# Patient Record
Sex: MALE | Race: WHITE | NOT HISPANIC OR LATINO | Employment: FULL TIME | URBAN - METROPOLITAN AREA
[De-identification: names, ages, dates, MRNs, and addresses within clinical notes are randomized per-mention and may not be internally consistent; named-entity substitution may affect disease eponyms.]

---

## 2023-04-23 DIAGNOSIS — K21.9 GASTRO-ESOPHAGEAL REFLUX DISEASE WITHOUT ESOPHAGITIS: ICD-10-CM

## 2023-04-24 ENCOUNTER — TELEPHONE (OUTPATIENT)
Dept: PRIMARY CARE | Facility: CLINIC | Age: 63
End: 2023-04-24
Payer: COMMERCIAL

## 2023-04-24 DIAGNOSIS — M62.830 BACK SPASM: ICD-10-CM

## 2023-04-24 DIAGNOSIS — I10 PRIMARY HYPERTENSION: Primary | ICD-10-CM

## 2023-04-24 DIAGNOSIS — K21.9 GASTROESOPHAGEAL REFLUX DISEASE WITHOUT ESOPHAGITIS: ICD-10-CM

## 2023-04-24 RX ORDER — LISINOPRIL AND HYDROCHLOROTHIAZIDE 12.5; 2 MG/1; MG/1
1 TABLET ORAL DAILY
Qty: 30 TABLET | Refills: 1 | Status: SHIPPED | OUTPATIENT
Start: 2023-04-24 | End: 2023-05-22 | Stop reason: SDUPTHER

## 2023-04-24 RX ORDER — PANTOPRAZOLE SODIUM 20 MG/1
20 TABLET, DELAYED RELEASE ORAL DAILY
COMMUNITY
End: 2023-04-24 | Stop reason: SDUPTHER

## 2023-04-24 RX ORDER — LISINOPRIL AND HYDROCHLOROTHIAZIDE 12.5; 2 MG/1; MG/1
1 TABLET ORAL DAILY
COMMUNITY
End: 2023-04-24 | Stop reason: SDUPTHER

## 2023-04-24 RX ORDER — CYCLOBENZAPRINE HCL 5 MG
5 TABLET ORAL 3 TIMES DAILY PRN
Qty: 30 TABLET | Refills: 1 | Status: SHIPPED | OUTPATIENT
Start: 2023-04-24 | End: 2023-05-22 | Stop reason: SDUPTHER

## 2023-04-24 RX ORDER — CYCLOBENZAPRINE HCL 5 MG
5 TABLET ORAL 3 TIMES DAILY PRN
COMMUNITY
Start: 2022-11-01 | End: 2023-04-24 | Stop reason: SDUPTHER

## 2023-04-24 RX ORDER — PANTOPRAZOLE SODIUM 20 MG/1
20 TABLET, DELAYED RELEASE ORAL DAILY
Qty: 30 TABLET | Refills: 1 | Status: SHIPPED | OUTPATIENT
Start: 2023-04-24 | End: 2023-12-29 | Stop reason: SDUPTHER

## 2023-04-24 RX ORDER — PANTOPRAZOLE SODIUM 20 MG/1
TABLET, DELAYED RELEASE ORAL
Qty: 90 TABLET | Refills: 1 | Status: SHIPPED | OUTPATIENT
Start: 2023-04-24 | End: 2023-04-24 | Stop reason: SDUPTHER

## 2023-04-24 NOTE — TELEPHONE ENCOUNTER
Rx Refill Request Telephone Encounter    Name:  Mike Parkinson  :  [id number]  Medication Name:  flexeril, protonix, lisinopril-hydrochlorothiazide    Dose : per chart    Specific Pharmacy location:  per chart  Date of last appointment:  November  Date of next appointment:  needs appt  Best number to reach patient:  per chart

## 2023-05-22 ENCOUNTER — OFFICE VISIT (OUTPATIENT)
Dept: PRIMARY CARE | Facility: CLINIC | Age: 63
End: 2023-05-22
Payer: COMMERCIAL

## 2023-05-22 VITALS
BODY MASS INDEX: 26.83 KG/M2 | TEMPERATURE: 98 F | OXYGEN SATURATION: 98 % | DIASTOLIC BLOOD PRESSURE: 79 MMHG | WEIGHT: 177 LBS | HEART RATE: 72 BPM | SYSTOLIC BLOOD PRESSURE: 117 MMHG | HEIGHT: 68 IN | RESPIRATION RATE: 16 BRPM

## 2023-05-22 DIAGNOSIS — M67.912 TENDINOPATHY OF LEFT SHOULDER: ICD-10-CM

## 2023-05-22 DIAGNOSIS — I10 HTN (HYPERTENSION), BENIGN: ICD-10-CM

## 2023-05-22 DIAGNOSIS — M54.41 CHRONIC BILATERAL LOW BACK PAIN WITH BILATERAL SCIATICA: Primary | ICD-10-CM

## 2023-05-22 DIAGNOSIS — G89.29 CHRONIC BILATERAL LOW BACK PAIN WITH BILATERAL SCIATICA: Primary | ICD-10-CM

## 2023-05-22 DIAGNOSIS — M54.42 CHRONIC BILATERAL LOW BACK PAIN WITH BILATERAL SCIATICA: Primary | ICD-10-CM

## 2023-05-22 PROBLEM — L29.89 PRURITIC ERYTHEMATOUS RASH: Status: RESOLVED | Noted: 2023-05-22 | Resolved: 2023-05-22

## 2023-05-22 PROBLEM — F41.9 ANXIETY: Status: ACTIVE | Noted: 2023-05-22

## 2023-05-22 PROBLEM — D72.823 LEUKEMOID REACTION: Status: RESOLVED | Noted: 2023-05-22 | Resolved: 2023-05-22

## 2023-05-22 PROBLEM — B34.2 CORONAVIRUS INFECTION, UNSPECIFIED: Status: RESOLVED | Noted: 2023-05-22 | Resolved: 2023-05-22

## 2023-05-22 PROBLEM — R07.9 CHEST PAIN: Status: RESOLVED | Noted: 2023-05-22 | Resolved: 2023-05-22

## 2023-05-22 PROBLEM — H61.23 BILATERAL IMPACTED CERUMEN: Status: RESOLVED | Noted: 2023-05-22 | Resolved: 2023-05-22

## 2023-05-22 PROBLEM — K58.2 IRRITABLE BOWEL SYNDROME WITH BOTH CONSTIPATION AND DIARRHEA: Status: ACTIVE | Noted: 2023-05-22

## 2023-05-22 PROBLEM — M54.2 NECK PAIN: Status: RESOLVED | Noted: 2023-05-22 | Resolved: 2023-05-22

## 2023-05-22 PROBLEM — L08.9 SKIN INFECTION, BACTERIAL: Status: RESOLVED | Noted: 2023-05-22 | Resolved: 2023-05-22

## 2023-05-22 PROBLEM — L82.1 SEBORRHEIC KERATOSIS: Status: ACTIVE | Noted: 2023-05-22

## 2023-05-22 PROBLEM — H10.30 ACUTE CONJUNCTIVITIS: Status: RESOLVED | Noted: 2023-05-22 | Resolved: 2023-05-22

## 2023-05-22 PROBLEM — H57.10 EYE PAIN: Status: RESOLVED | Noted: 2023-05-22 | Resolved: 2023-05-22

## 2023-05-22 PROBLEM — K21.9 GASTROESOPHAGEAL REFLUX DISEASE WITHOUT ESOPHAGITIS: Status: ACTIVE | Noted: 2023-05-22

## 2023-05-22 PROBLEM — J06.9 VIRAL URI WITH COUGH: Status: RESOLVED | Noted: 2023-05-22 | Resolved: 2023-05-22

## 2023-05-22 PROBLEM — L30.9 DERMATITIS: Status: RESOLVED | Noted: 2023-05-22 | Resolved: 2023-05-22

## 2023-05-22 PROBLEM — B96.89 SKIN INFECTION, BACTERIAL: Status: RESOLVED | Noted: 2023-05-22 | Resolved: 2023-05-22

## 2023-05-22 PROBLEM — L29.8 PRURITIC ERYTHEMATOUS RASH: Status: RESOLVED | Noted: 2023-05-22 | Resolved: 2023-05-22

## 2023-05-22 PROCEDURE — 99214 OFFICE O/P EST MOD 30 MIN: CPT

## 2023-05-22 PROCEDURE — 3078F DIAST BP <80 MM HG: CPT

## 2023-05-22 PROCEDURE — 3074F SYST BP LT 130 MM HG: CPT

## 2023-05-22 PROCEDURE — 1036F TOBACCO NON-USER: CPT

## 2023-05-22 RX ORDER — LORATADINE 10 MG
10 TABLET,DISINTEGRATING ORAL DAILY
COMMUNITY
End: 2023-12-29 | Stop reason: WASHOUT

## 2023-05-22 RX ORDER — CYCLOBENZAPRINE HCL 5 MG
5 TABLET ORAL 3 TIMES DAILY PRN
Qty: 90 TABLET | Refills: 0 | Status: SHIPPED | OUTPATIENT
Start: 2023-05-22 | End: 2023-08-20

## 2023-05-22 RX ORDER — LISINOPRIL AND HYDROCHLOROTHIAZIDE 12.5; 2 MG/1; MG/1
1 TABLET ORAL NIGHTLY
Qty: 90 TABLET | Refills: 1 | Status: SHIPPED | OUTPATIENT
Start: 2023-05-22 | End: 2023-08-14 | Stop reason: SINTOL

## 2023-05-22 ASSESSMENT — ENCOUNTER SYMPTOMS
LIGHT-HEADEDNESS: 0
DIARRHEA: 0
CONSTIPATION: 0
HEADACHES: 0
CHEST TIGHTNESS: 0
ARTHRALGIAS: 1
CHILLS: 0
BACK PAIN: 1
SHORTNESS OF BREATH: 1
DIZZINESS: 0
ABDOMINAL PAIN: 0
PALPITATIONS: 0
FEVER: 0
HYPERTENSION: 1

## 2023-05-22 NOTE — PROGRESS NOTES
"Subjective   Mike Parkinson is a 62 y.o. male who presents for Follow-up (Follow up for blood pressure).    Hypertension  This is a chronic problem. Associated symptoms include anxiety and shortness of breath (with exercise). Pertinent negatives include no chest pain, headaches or palpitations.   BP at home mostly 120-130's/ 70-80s.  Occ 140 and 150s.  Taking medication at night, since it makes him drowsy. No side effects noted.    Flexeril is helping back pain. Taking as needed. Took 3 this past week. Persistent standing makes back pain worse, has gone through PT with improvement. Doing PT exercises few times a week.  Endorsing difficulty in exercising, especially walking, due to back pain.  Trying to walk at lunch hour, however limited when experiencing back pain.  Reports mild shortness of breath with exertion such as mowing the lawn. Doesn't need to stop and catch breath with activity.    Also reporting a weeklong history of left shoulder pain.  States he might of slept on his shoulder wrong.  No identifiable injury.  Has pain with putting on jacket or shirt.  Has not noticed any weakness.    Recently in ED for dehydration at [place].    Review of Systems   Constitutional:  Negative for chills and fever.   Eyes:  Negative for visual disturbance.   Respiratory:  Positive for shortness of breath (with exercise). Negative for chest tightness.    Cardiovascular:  Negative for chest pain, palpitations and leg swelling.   Gastrointestinal:  Negative for abdominal pain, constipation and diarrhea.   Musculoskeletal:  Positive for arthralgias (left shoulder) and back pain.   Neurological:  Negative for dizziness, light-headedness and headaches.     Objective     /79 (BP Location: Right arm, Patient Position: Sitting)   Pulse 72   Temp 36.7 °C (98 °F)   Resp 16   Ht 1.727 m (5' 8\")   Wt 80.3 kg (177 lb)   SpO2 98%   BMI 26.91 kg/m²     Physical Exam  Vitals reviewed.   Constitutional:       General: He is not in " acute distress.     Appearance: Normal appearance.   HENT:      Mouth/Throat:      Mouth: Mucous membranes are moist.      Pharynx: Oropharynx is clear.   Eyes:      Extraocular Movements: Extraocular movements intact.      Pupils: Pupils are equal, round, and reactive to light.   Cardiovascular:      Rate and Rhythm: Normal rate and regular rhythm.      Pulses: Normal pulses.      Heart sounds: Normal heart sounds. No murmur heard.  Pulmonary:      Effort: Pulmonary effort is normal.      Breath sounds: Normal breath sounds. No wheezing, rhonchi or rales.   Musculoskeletal:      Comments: Full range of motion and strength intact.  Pain with external rotation of left shoulder, point tenderness noted to posterior shoulder near teres minor.  No tenderness to SC joint, clavicle, AC joint.   Neurological:      Mental Status: He is alert.   Psychiatric:         Mood and Affect: Mood normal.       Assessment/Plan   Problem List Items Addressed This Visit       Chronic bilateral low back pain with bilateral sciatica - Primary     Still having pain with prolonged standing and walking.  Emphasized performing back exercises 3-5 times a week to help with this.  Refilled Flexeril for what appears to be helping.  Follow-up sooner if would like to try physical therapy again.         Relevant Medications    cyclobenzaprine (Flexeril) 5 mg tablet    HTN (hypertension), benign     Controlled with lisinopril-hydrochlorothiazide combination.  Continue to check blood pressure 2-3 times a week.  Lab work was done 6 months ago.  Will check CMP for electrolyte monitoring.  Follow-up in 6 months.         Relevant Medications    lisinopriL-hydrochlorothiazide 20-12.5 mg tablet    Other Relevant Orders    Comprehensive metabolic panel     Other Visit Diagnoses       Tendinopathy of left shoulder            Left shoulder pain likely consistent with tendinopathy given age could have component of arthritis.  Likely glenohumeral given most  amount of pain with external rotation.  Provided shoulder exercises to improve strength and range of motion.  Follow-up if persistent, consider PT.     Discussed with Attending,    Roselia Watt, DO PGY-2

## 2023-05-22 NOTE — ASSESSMENT & PLAN NOTE
Controlled with lisinopril-hydrochlorothiazide combination.  Continue to check blood pressure 2-3 times a week.  Lab work was done 6 months ago.  Will check CMP for electrolyte monitoring.  Follow-up in 6 months.

## 2023-05-22 NOTE — PROGRESS NOTES
I reviewed with the resident the medical history and the resident’s findings on physical examination.  I discussed with the resident the patient’s diagnosis and concur with the treatment plan as documented in the resident note.   Patient present for multiple issues.  He is more concerned about his blood pressure.  We will continue to monitor this.  Patient also has had back pain is chronic he has had physical therapy in the past.  Still bothering him.  Continue exercises.  Patient if he still having symptoms in 2 weeks can get an MRI.  Patient also is having some shortness of breath with activity.  He attributes this to deconditioning.  Its been going on for some time.  Per patient he did have a stress test 2 years ago.  We are not able to find that in the chart.  We will try to confirm it if so we will continue to monitor.  If not he will need to see cardiology and get a stress test.  Patient aware of the risk.  If he starts have any chest pain shortness of breath any nausea vomiting diarrhea fever headache any concerning symptoms go to the ER.  Follow-up in 2 weeks  Agree with assessment and plan    Kory Kumar, DO

## 2023-05-22 NOTE — PATIENT INSTRUCTIONS
It was nice seeing you in the office today.  Sign up for [employer] to get results instantly.  Labs will be fasting.  Try exercises for left shoulder.  Follow up 6 months for physical.  Call the office: [phone] for questions or concerns.

## 2023-05-22 NOTE — ASSESSMENT & PLAN NOTE
Still having pain with prolonged standing and walking.  Emphasized performing back exercises 3-5 times a week to help with this.  Refilled Flexeril for what appears to be helping.  Follow-up sooner if would like to try physical therapy again.

## 2023-06-01 ENCOUNTER — LAB (OUTPATIENT)
Dept: LAB | Facility: LAB | Age: 63
End: 2023-06-01
Payer: COMMERCIAL

## 2023-06-01 ENCOUNTER — TELEPHONE (OUTPATIENT)
Dept: PRIMARY CARE | Facility: CLINIC | Age: 63
End: 2023-06-01

## 2023-06-01 DIAGNOSIS — I10 HTN (HYPERTENSION), BENIGN: ICD-10-CM

## 2023-06-01 DIAGNOSIS — E87.1 HYPONATREMIA: Primary | ICD-10-CM

## 2023-06-01 LAB
ALANINE AMINOTRANSFERASE (SGPT) (U/L) IN SER/PLAS: 21 U/L (ref 10–52)
ALBUMIN (G/DL) IN SER/PLAS: 4.6 G/DL (ref 3.4–5)
ALKALINE PHOSPHATASE (U/L) IN SER/PLAS: 71 U/L (ref 33–136)
ANION GAP IN SER/PLAS: 12 MMOL/L (ref 10–20)
ASPARTATE AMINOTRANSFERASE (SGOT) (U/L) IN SER/PLAS: 20 U/L (ref 9–39)
BILIRUBIN TOTAL (MG/DL) IN SER/PLAS: 0.9 MG/DL (ref 0–1.2)
CALCIUM (MG/DL) IN SER/PLAS: 9.5 MG/DL (ref 8.6–10.3)
CARBON DIOXIDE, TOTAL (MMOL/L) IN SER/PLAS: 24 MMOL/L (ref 21–32)
CHLORIDE (MMOL/L) IN SER/PLAS: 99 MMOL/L (ref 98–107)
CREATININE (MG/DL) IN SER/PLAS: 0.82 MG/DL (ref 0.5–1.3)
GFR MALE: >90 ML/MIN/1.73M2
GLUCOSE (MG/DL) IN SER/PLAS: 105 MG/DL (ref 74–99)
POTASSIUM (MMOL/L) IN SER/PLAS: 3.9 MMOL/L (ref 3.5–5.3)
PROTEIN TOTAL: 7.2 G/DL (ref 6.4–8.2)
SODIUM (MMOL/L) IN SER/PLAS: 131 MMOL/L (ref 136–145)
UREA NITROGEN (MG/DL) IN SER/PLAS: 8 MG/DL (ref 6–23)

## 2023-06-01 PROCEDURE — 36415 COLL VENOUS BLD VENIPUNCTURE: CPT

## 2023-06-01 PROCEDURE — 80053 COMPREHEN METABOLIC PANEL: CPT

## 2023-06-01 NOTE — RESULT ENCOUNTER NOTE
Spoke with patient over the phone regarding lab work.  States he was fasting for at least 12 hours before obtaining labs he also drank about 16 ounces of water before obtaining labs.  He has elevated glucose and low sodium. Talked about diet eating more whole fruits and vegetables, lean meats, fiber. Limit fluids prior to lab work. Will recheck BMP in 4 weeks.

## 2023-06-01 NOTE — RESULT ENCOUNTER NOTE
Patient's sodium is still low.  He usually sees Dr. Zamorano.  Can we make sure he schedules a follow-up with Dr. Zamorano to determine why his sodium is low.  Please assure its in the next week  Let the patient know also, thank you

## 2023-06-05 LAB
B. BURGDORFERI VLSE1/PEPC10 ABS, ELISA: 0.15 IV
ROCKY MOUNTAIN SPOTTED FEVER IGG ANTIBODY: NORMAL
ROCKY MOUNTAIN SPOTTED FEVER IGM ANTIBODY: NORMAL

## 2023-06-06 LAB — LYME DISEASE (BORRELIA BURGDORFERI), PCR: NOT DETECTED

## 2023-06-29 ENCOUNTER — LAB (OUTPATIENT)
Dept: LAB | Facility: LAB | Age: 63
End: 2023-06-29
Payer: COMMERCIAL

## 2023-06-29 DIAGNOSIS — E87.1 HYPONATREMIA: ICD-10-CM

## 2023-06-29 LAB
ANION GAP IN SER/PLAS: 11 MMOL/L (ref 10–20)
CALCIUM (MG/DL) IN SER/PLAS: 9.7 MG/DL (ref 8.6–10.3)
CARBON DIOXIDE, TOTAL (MMOL/L) IN SER/PLAS: 28 MMOL/L (ref 21–32)
CHLORIDE (MMOL/L) IN SER/PLAS: 102 MMOL/L (ref 98–107)
CREATININE (MG/DL) IN SER/PLAS: 0.88 MG/DL (ref 0.5–1.3)
GFR MALE: >90 ML/MIN/1.73M2
GLUCOSE (MG/DL) IN SER/PLAS: 106 MG/DL (ref 74–99)
POTASSIUM (MMOL/L) IN SER/PLAS: 4.6 MMOL/L (ref 3.5–5.3)
SODIUM (MMOL/L) IN SER/PLAS: 136 MMOL/L (ref 136–145)
UREA NITROGEN (MG/DL) IN SER/PLAS: 11 MG/DL (ref 6–23)

## 2023-06-29 PROCEDURE — 36415 COLL VENOUS BLD VENIPUNCTURE: CPT

## 2023-06-29 PROCEDURE — 80048 BASIC METABOLIC PNL TOTAL CA: CPT

## 2023-07-31 ENCOUNTER — TELEPHONE (OUTPATIENT)
Dept: PRIMARY CARE | Facility: CLINIC | Age: 63
End: 2023-07-31
Payer: COMMERCIAL

## 2023-07-31 DIAGNOSIS — I10 HTN (HYPERTENSION), BENIGN: Primary | ICD-10-CM

## 2023-07-31 RX ORDER — ACETAZOLAMIDE 125 MG/1
125 TABLET ORAL DAILY
Qty: 7 TABLET | Refills: 0 | Status: SHIPPED | OUTPATIENT
Start: 2023-07-31 | End: 2023-08-14

## 2023-08-08 ENCOUNTER — DOCUMENTATION (OUTPATIENT)
Dept: PRIMARY CARE | Facility: CLINIC | Age: 63
End: 2023-08-08
Payer: COMMERCIAL

## 2023-08-08 ENCOUNTER — PATIENT OUTREACH (OUTPATIENT)
Dept: PRIMARY CARE | Facility: CLINIC | Age: 63
End: 2023-08-08
Payer: COMMERCIAL

## 2023-08-08 NOTE — PROGRESS NOTES
Discharge Facility: [facility]  Discharge Diagnosis:HYPONATREMIA  Admission Date:8/6/2023  Discharge Date: 8/7/2023    PCP Appointment Date:8/14/2023  Specialist Appointment Date: NONE  Hospital Encounter and Summary: Linked   See discharge assessment below for further details       Engagement  Call Start Time: 1132 (8/8/2023 11:32 AM)    Medications  Medications reviewed with patient/caregiver?: Yes (8/8/2023 11:32 AM)  Is the patient having any side effects they believe may be caused by any medication additions or changes?: No (8/8/2023 11:32 AM)  Does the patient have all medications ordered at discharge?: Yes (no new meds. stop lisinopril-hctz) (8/8/2023 11:32 AM)  Is the patient taking all medications as directed (includes completed medication regime)?: Yes (8/8/2023 11:32 AM)  Medication Comments: see med list (8/8/2023 11:32 AM)    Appointments  Does the patient have a primary care provider?: Yes (8/8/2023 11:32 AM)  Care Management Interventions: Verified appointment date/time/provider (8/8/2023 11:32 AM)  Has the patient kept scheduled appointments due by today?: Yes (8/8/2023 11:32 AM)  Care Management Interventions: Advised patient to keep appointment (8/8/2023 11:32 AM)    Self Management  What is the home health agency?: none (8/8/2023 11:32 AM)  Has home health visited the patient within 72 hours of discharge?: Not applicable (8/8/2023 11:32 AM)    Patient Teaching  Does the patient have access to their discharge instructions?: Yes (8/8/2023 11:32 AM)  Care Management Interventions: Reviewed instructions with patient (8/8/2023 11:32 AM)  What is the patient's perception of their health status since discharge?: Improving (8/8/2023 11:32 AM)  Is the patient/caregiver able to teach back the hierarchy of who to call/visit for symptoms/problems? PCP, Specialist, Home Health nurse, Urgent Care, ED, 911: Yes (8/8/2023 11:32 AM)    Wrap Up  Wrap Up Additional Comments: spoke with patient. he stated that he is doing  better. still a little tired. eating and sleeping. aware of medication change. has follow up with pcp scheduled. pt voiced no concerns or questions at this time. (8/8/2023 11:32 AM)  Call End Time: 1136 (8/8/2023 11:32 AM)

## 2023-08-14 ENCOUNTER — OFFICE VISIT (OUTPATIENT)
Dept: PRIMARY CARE | Facility: CLINIC | Age: 63
End: 2023-08-14
Payer: COMMERCIAL

## 2023-08-14 VITALS
SYSTOLIC BLOOD PRESSURE: 152 MMHG | TEMPERATURE: 98.1 F | BODY MASS INDEX: 27.67 KG/M2 | HEART RATE: 70 BPM | RESPIRATION RATE: 18 BRPM | OXYGEN SATURATION: 98 % | DIASTOLIC BLOOD PRESSURE: 89 MMHG | WEIGHT: 182 LBS

## 2023-08-14 DIAGNOSIS — E87.1 ACUTE HYPONATREMIA: ICD-10-CM

## 2023-08-14 DIAGNOSIS — I10 HTN (HYPERTENSION), BENIGN: Primary | ICD-10-CM

## 2023-08-14 PROCEDURE — 3079F DIAST BP 80-89 MM HG: CPT

## 2023-08-14 PROCEDURE — 99213 OFFICE O/P EST LOW 20 MIN: CPT

## 2023-08-14 PROCEDURE — 3077F SYST BP >= 140 MM HG: CPT

## 2023-08-14 PROCEDURE — 1036F TOBACCO NON-USER: CPT

## 2023-08-14 RX ORDER — AMLODIPINE AND BENAZEPRIL HYDROCHLORIDE 5; 20 MG/1; MG/1
1 CAPSULE ORAL DAILY
Qty: 90 CAPSULE | Refills: 0 | Status: SHIPPED | OUTPATIENT
Start: 2023-08-14 | End: 2023-08-14 | Stop reason: ENTERED-IN-ERROR

## 2023-08-14 RX ORDER — BENAZEPRIL HYDROCHLORIDE 20 MG/1
20 TABLET ORAL DAILY
Qty: 90 TABLET | Refills: 0 | Status: SHIPPED | OUTPATIENT
Start: 2023-08-14 | End: 2023-12-20 | Stop reason: SDUPTHER

## 2023-08-14 ASSESSMENT — ENCOUNTER SYMPTOMS
PALPITATIONS: 0
CHILLS: 0
LIGHT-HEADEDNESS: 1
APPETITE CHANGE: 0
FEVER: 0
ACTIVITY CHANGE: 0
SHORTNESS OF BREATH: 0

## 2023-08-14 NOTE — PROGRESS NOTES
"Patient: Mike Parkinson  : 1960  PCP: Wing Zamorano DO  MRN: [id number]  Program: No linked episodes     Mike Parkinson is a 62 y.o. male presenting today for follow-up after being discharged from the hospital 7 days ago. The main problem requiring admission was hypovolemic hyponatremia. The discharge summary and/or Transitional Care Management documentation was reviewed. Medication reconciliation was performed as indicated via the \"Robert as Reviewed\" timestamp.     Mike Parkinson was contacted by Transitional Care Management services two days after his discharge. This encounter and supporting documentation was reviewed.    The complexity of medical decision making for this patient's transitional care is moderate.  Reviewed discharge summary on 2023.    He was having altitude sickness while in [place], went to  and given Zofran with some improvement in nausea. Came back to [place], still with nausea, decreased appetite, still lightheaded and about to fall so he went to ED, and found to have low sodium due to fluid losses. Urine sodium c/w hypovolemic hyponatremia.    He is drinking more than 2 liters in 24 hour period. Some Gatorade.   No more nausea, now with an appetite.    BP has been 140/80 at home.  He does like to fish and would like to keep fishing in the future. He states he has had altitude sickness in the past.    Physical Exam  Vitals reviewed.   Constitutional:       General: He is not in acute distress.     Appearance: Normal appearance. He is not ill-appearing.   HENT:      Head: Normocephalic.      Mouth/Throat:      Mouth: Mucous membranes are moist.      Pharynx: Oropharynx is clear.   Cardiovascular:      Rate and Rhythm: Normal rate and regular rhythm.      Pulses: Normal pulses.      Heart sounds: Normal heart sounds. No murmur heard.  Pulmonary:      Effort: Pulmonary effort is normal.      Breath sounds: Normal breath sounds. No wheezing, rhonchi or rales.   Skin:     General: Skin " is warm and dry.      Capillary Refill: Capillary refill takes less than 2 seconds.   Neurological:      Mental Status: He is alert.   Psychiatric:         Mood and Affect: Mood normal.       Assessment/Plan   Problem List Items Addressed This Visit       HTN (hypertension), benign - Primary     Restart blood pressure medication, benazepril 20 mg.  Patient to check blood pressures 2-3 times per week and call or message in 1 month his blood pressure readings.  If tolerating and no longer lightheaded with blood pressure less than 130/80, will continue.  He is due for physical in November of this year.             Relevant Medications    benazepril (Lotensin) 20 mg tablet     Other Visit Diagnoses       Acute hyponatremia        Sodium at discharge was 131.  Repeat BMP.    Relevant Orders    Basic metabolic panel          Review of Systems   Constitutional:  Negative for activity change, appetite change, chills and fever.   Respiratory:  Negative for shortness of breath.    Cardiovascular:  Negative for chest pain, palpitations and leg swelling.   Neurological:  Positive for light-headedness (with activity).     No family history on file.    Engagement  Call Start Time: 1132 (8/8/2023 11:32 AM)    Medications  Medications reviewed with patient/caregiver?: Yes (8/8/2023 11:32 AM)  Is the patient having any side effects they believe may be caused by any medication additions or changes?: No (8/8/2023 11:32 AM)  Does the patient have all medications ordered at discharge?: Yes (no new meds. stop lisinopril-hctz) (8/8/2023 11:32 AM)  Is the patient taking all medications as directed (includes completed medication regime)?: Yes (8/8/2023 11:32 AM)  Medication Comments: see med list (8/8/2023 11:32 AM)    Appointments  Does the patient have a primary care provider?: Yes (8/8/2023 11:32 AM)  Care Management Interventions: Verified appointment date/time/provider (8/8/2023 11:32 AM)  Has the patient kept scheduled appointments  due by today?: Yes (8/8/2023 11:32 AM)  Care Management Interventions: Advised patient to keep appointment (8/8/2023 11:32 AM)    Self Management  What is the home health agency?: none (8/8/2023 11:32 AM)  Has home health visited the patient within 72 hours of discharge?: Not applicable (8/8/2023 11:32 AM)    Patient Teaching  Does the patient have access to their discharge instructions?: Yes (8/8/2023 11:32 AM)  Care Management Interventions: Reviewed instructions with patient (8/8/2023 11:32 AM)  What is the patient's perception of their health status since discharge?: Improving (8/8/2023 11:32 AM)  Is the patient/caregiver able to teach back the hierarchy of who to call/visit for symptoms/problems? PCP, Specialist, Home Health nurse, Urgent Care, ED, 911: Yes (8/8/2023 11:32 AM)    Wrap Up  Wrap Up Additional Comments: spoke with patient. he stated that he is doing better. still a little tired. eating and sleeping. aware of medication change. has follow up with pcp scheduled. pt voiced no concerns or questions at this time. (8/8/2023 11:32 AM)  Call End Time: 1136 (8/8/2023 11:32 AM)      Discussed with Attending,    Roselia Watt, DO PGY-3

## 2023-08-14 NOTE — ASSESSMENT & PLAN NOTE
Restart blood pressure medication, benazepril 20 mg.  Patient to check blood pressures 2-3 times per week and call or message in 1 month his blood pressure readings.  If tolerating and no longer lightheaded with blood pressure less than 130/80, will continue.  He is due for physical in November of this year.

## 2023-08-14 NOTE — PROGRESS NOTES
I reviewed with the resident the medical history and the resident’s findings on physical examination.  I discussed with the resident the patient’s diagnosis and concur with the treatment plan as documented in the resident note.   Patient is doing better.  He still felt a little off cutting the grass.  Is been trying to hydrate, watch and wait drinks.  His blood pressure is high today.  States stopped his blood pressure medications.  We will restart ACE inhibitor.  This could be contributing somewhat to his hyponatremia so we will recheck his blood work in a few days.  Patient is can monitor his blood pressure he comes in the office in a few days for a blood pressure check.  If it still elevated we can add amlodipine.  We can hold off on a diuretic at this time.  Patient continues to make sure he is hydrating  Patient aware if any chest pain shortness of breath any nausea vomiting or fever headache any concerning symptoms go to the ER, if any dizziness any confusion go to the ER  Follow-up in 2 weeks  Side effects of medication explained  Agree with assessment and plan  Kory Kumar, DO

## 2023-08-14 NOTE — PATIENT INSTRUCTIONS
It was nice seeing you in the office today.  Sign up for [employer] to get results instantly.  Obtain labs.  Call office/ send my chart message in 1 month with BP readings.  Call the office: [phone] for questions or concerns.  Please allow 48-72 hours for medication refills.

## 2023-08-18 ENCOUNTER — PATIENT OUTREACH (OUTPATIENT)
Dept: PRIMARY CARE | Facility: CLINIC | Age: 63
End: 2023-08-18
Payer: COMMERCIAL

## 2023-08-18 NOTE — PROGRESS NOTES
Call regarding appt. with PCP on 8/14/2023 after hospitalization.  At time of outreach call the patient feels as if their condition has improved since last visit.  Reviewed the PCP appointment with the pt and addressed any questions or concerns.

## 2023-08-21 ENCOUNTER — LAB (OUTPATIENT)
Dept: LAB | Facility: LAB | Age: 63
End: 2023-08-21
Payer: COMMERCIAL

## 2023-08-21 DIAGNOSIS — E87.1 ACUTE HYPONATREMIA: ICD-10-CM

## 2023-08-21 LAB
ANION GAP IN SER/PLAS: 12 MMOL/L (ref 10–20)
CALCIUM (MG/DL) IN SER/PLAS: 9.4 MG/DL (ref 8.6–10.3)
CARBON DIOXIDE, TOTAL (MMOL/L) IN SER/PLAS: 24 MMOL/L (ref 21–32)
CHLORIDE (MMOL/L) IN SER/PLAS: 107 MMOL/L (ref 98–107)
CREATININE (MG/DL) IN SER/PLAS: 0.79 MG/DL (ref 0.5–1.3)
GFR MALE: >90 ML/MIN/1.73M2
GLUCOSE (MG/DL) IN SER/PLAS: 89 MG/DL (ref 74–99)
POTASSIUM (MMOL/L) IN SER/PLAS: 4.3 MMOL/L (ref 3.5–5.3)
SODIUM (MMOL/L) IN SER/PLAS: 139 MMOL/L (ref 136–145)
UREA NITROGEN (MG/DL) IN SER/PLAS: 11 MG/DL (ref 6–23)

## 2023-08-21 PROCEDURE — 36415 COLL VENOUS BLD VENIPUNCTURE: CPT

## 2023-08-21 PROCEDURE — 80048 BASIC METABOLIC PNL TOTAL CA: CPT

## 2023-09-18 ENCOUNTER — PATIENT OUTREACH (OUTPATIENT)
Dept: PRIMARY CARE | Facility: CLINIC | Age: 63
End: 2023-09-18
Payer: COMMERCIAL

## 2023-09-18 NOTE — PROGRESS NOTES
Unable to reach patient for one month post discharge follow up call. LVM with call back number for patient to call if needed

## 2023-11-07 ENCOUNTER — PATIENT OUTREACH (OUTPATIENT)
Dept: PRIMARY CARE | Facility: CLINIC | Age: 63
End: 2023-11-07
Payer: COMMERCIAL

## 2023-12-19 ENCOUNTER — TELEPHONE (OUTPATIENT)
Dept: PRIMARY CARE | Facility: CLINIC | Age: 63
End: 2023-12-19
Payer: COMMERCIAL

## 2023-12-19 DIAGNOSIS — I10 HTN (HYPERTENSION), BENIGN: ICD-10-CM

## 2023-12-20 RX ORDER — BENAZEPRIL HYDROCHLORIDE 20 MG/1
20 TABLET ORAL DAILY
Qty: 90 TABLET | Refills: 0 | Status: SHIPPED | OUTPATIENT
Start: 2023-12-20 | End: 2023-12-29 | Stop reason: SDUPTHER

## 2023-12-29 ENCOUNTER — OFFICE VISIT (OUTPATIENT)
Dept: PRIMARY CARE | Facility: CLINIC | Age: 63
End: 2023-12-29
Payer: COMMERCIAL

## 2023-12-29 VITALS
TEMPERATURE: 98.3 F | HEART RATE: 75 BPM | WEIGHT: 176 LBS | SYSTOLIC BLOOD PRESSURE: 124 MMHG | BODY MASS INDEX: 26.07 KG/M2 | DIASTOLIC BLOOD PRESSURE: 78 MMHG | HEIGHT: 69 IN | OXYGEN SATURATION: 97 % | RESPIRATION RATE: 18 BRPM

## 2023-12-29 DIAGNOSIS — J30.89 SEASONAL ALLERGIC RHINITIS DUE TO OTHER ALLERGIC TRIGGER: Primary | ICD-10-CM

## 2023-12-29 DIAGNOSIS — F45.8 BRUXISM: ICD-10-CM

## 2023-12-29 DIAGNOSIS — K21.9 GASTROESOPHAGEAL REFLUX DISEASE WITHOUT ESOPHAGITIS: ICD-10-CM

## 2023-12-29 DIAGNOSIS — I10 HTN (HYPERTENSION), BENIGN: ICD-10-CM

## 2023-12-29 PROCEDURE — 3078F DIAST BP <80 MM HG: CPT

## 2023-12-29 PROCEDURE — 99213 OFFICE O/P EST LOW 20 MIN: CPT

## 2023-12-29 PROCEDURE — 3074F SYST BP LT 130 MM HG: CPT

## 2023-12-29 PROCEDURE — 1036F TOBACCO NON-USER: CPT

## 2023-12-29 RX ORDER — PANTOPRAZOLE SODIUM 20 MG/1
20 TABLET, DELAYED RELEASE ORAL DAILY
Qty: 180 TABLET | Refills: 0 | Status: SHIPPED | OUTPATIENT
Start: 2023-12-29 | End: 2024-06-26

## 2023-12-29 RX ORDER — BENAZEPRIL HYDROCHLORIDE 20 MG/1
20 TABLET ORAL DAILY
Qty: 90 TABLET | Refills: 0 | Status: SHIPPED | OUTPATIENT
Start: 2023-12-29 | End: 2024-01-08 | Stop reason: ALTCHOICE

## 2023-12-29 RX ORDER — FLUTICASONE PROPIONATE 50 MCG
1 SPRAY, SUSPENSION (ML) NASAL DAILY
Qty: 16 G | Refills: 11 | Status: SHIPPED | OUTPATIENT
Start: 2023-12-29 | End: 2024-12-28

## 2023-12-29 ASSESSMENT — ENCOUNTER SYMPTOMS
SHORTNESS OF BREATH: 0
CONSTIPATION: 0
DIARRHEA: 0
ABDOMINAL PAIN: 0
HEADACHES: 1
CHEST TIGHTNESS: 0
PALPITATIONS: 0

## 2023-12-29 NOTE — PROGRESS NOTES
"Subjective   Mike Parkinson is a 63 y.o. male who presents for Hypertension (HTN follow up) and Med Refill (Medication refill needed for Pantoprazole.).    HPI    Mike is here for BP follow up. He has been out of medication for a week. Refill sent in on 12/20, did not .  He is asymptomatic.  Also needs refill of acid reflux medication, states it has been worse since running out of BP meds.  Could avoid more salt.  Not checking bp at home.    Saw the dentist and he is grinding in his sleep, getting mouth guard.  Has also been congested for several months, taking Claritin.    Review of Systems   Eyes:  Negative for visual disturbance.   Respiratory:  Negative for chest tightness and shortness of breath.    Cardiovascular:  Negative for chest pain, palpitations and leg swelling.   Gastrointestinal:  Negative for abdominal pain, constipation and diarrhea.   Neurological:  Positive for headaches (night time).     Objective     /78 (BP Location: Right arm, Patient Position: Sitting)   Pulse 75   Temp 36.8 °C (98.3 °F)   Resp 18   Ht 1.753 m (5' 9\")   Wt 79.8 kg (176 lb)   SpO2 97%   BMI 25.99 kg/m²   Physical Exam  Vitals reviewed.   Constitutional:       General: He is not in acute distress.     Appearance: Normal appearance.   HENT:      Head: Normocephalic.   Cardiovascular:      Rate and Rhythm: Normal rate and regular rhythm.      Pulses: Normal pulses.      Heart sounds: Normal heart sounds. No murmur heard.  Pulmonary:      Effort: Pulmonary effort is normal.      Breath sounds: Normal breath sounds. No wheezing, rhonchi or rales.   Musculoskeletal:      Right lower leg: No edema.      Left lower leg: No edema.   Neurological:      Mental Status: He is alert.   Psychiatric:         Mood and Affect: Mood normal.       Assessment/Plan   Problem List Items Addressed This Visit             ICD-10-CM    Gastroesophageal reflux disease without esophagitis K21.9    Relevant Medications    pantoprazole " (ProtoNix) 20 mg EC tablet    HTN (hypertension), benign I10    Relevant Medications    benazepril (Lotensin) 20 mg tablet    Other Relevant Orders    Follow Up In [facility] - PCP - Health Maintenance     Other Visit Diagnoses         Codes    Seasonal allergic rhinitis due to other allergic trigger    -  Primary J30.89    Relevant Medications    fluticasone (Flonase) 50 mcg/actuation nasal spray    Bruxism     F45.8          HTN: refilled medications.  Recommend checking blood pressure at home 2-3 times per week and logging results.  GERD: Refilled pantoprazole.  Allergic rhinitis: Added Flonase for his symptoms.  Follow-up as needed if not getting better.  He is due for physical.  Recommend he follow-up in 1 to 2 months for this.    Discussed with Attending,    Roselia Watt, DO PGY-3

## 2023-12-29 NOTE — PATIENT INSTRUCTIONS
It was nice seeing you in the office today.  Sign up for [employer] to get results instantly.  Check BP 2-3x/week.    Follow up 1 month for physical.    Call the office: [phone] for questions or concerns.  Please allow 48-72 hours for medication refills.

## 2024-01-05 ENCOUNTER — APPOINTMENT (OUTPATIENT)
Dept: CARDIOLOGY | Facility: HOSPITAL | Age: 64
End: 2024-01-05
Payer: COMMERCIAL

## 2024-01-05 ENCOUNTER — HOSPITAL ENCOUNTER (EMERGENCY)
Facility: HOSPITAL | Age: 64
Discharge: HOME | End: 2024-01-05
Attending: EMERGENCY MEDICINE
Payer: COMMERCIAL

## 2024-01-05 ENCOUNTER — APPOINTMENT (OUTPATIENT)
Dept: RADIOLOGY | Facility: HOSPITAL | Age: 64
End: 2024-01-05
Payer: COMMERCIAL

## 2024-01-05 VITALS
HEIGHT: 68 IN | WEIGHT: 160 LBS | OXYGEN SATURATION: 95 % | SYSTOLIC BLOOD PRESSURE: 133 MMHG | DIASTOLIC BLOOD PRESSURE: 79 MMHG | RESPIRATION RATE: 16 BRPM | BODY MASS INDEX: 24.25 KG/M2 | HEART RATE: 72 BPM | TEMPERATURE: 97.3 F

## 2024-01-05 DIAGNOSIS — I10 HYPERTENSION, UNSPECIFIED TYPE: ICD-10-CM

## 2024-01-05 DIAGNOSIS — R42 LIGHTHEADEDNESS: Primary | ICD-10-CM

## 2024-01-05 LAB
ALBUMIN SERPL BCP-MCNC: 4.5 G/DL (ref 3.4–5)
ALP SERPL-CCNC: 71 U/L (ref 33–136)
ALT SERPL W P-5'-P-CCNC: 23 U/L (ref 10–52)
ANION GAP SERPL CALC-SCNC: 14 MMOL/L (ref 10–20)
AST SERPL W P-5'-P-CCNC: 22 U/L (ref 9–39)
BASOPHILS # BLD AUTO: 0.05 X10*3/UL (ref 0–0.1)
BASOPHILS NFR BLD AUTO: 0.5 %
BILIRUB SERPL-MCNC: 0.6 MG/DL (ref 0–1.2)
BUN SERPL-MCNC: 11 MG/DL (ref 6–23)
CALCIUM SERPL-MCNC: 8.8 MG/DL (ref 8.6–10.3)
CARDIAC TROPONIN I PNL SERPL HS: 3 NG/L (ref 0–20)
CARDIAC TROPONIN I PNL SERPL HS: 9 NG/L (ref 0–20)
CHLORIDE SERPL-SCNC: 106 MMOL/L (ref 98–107)
CO2 SERPL-SCNC: 22 MMOL/L (ref 21–32)
CREAT SERPL-MCNC: 0.85 MG/DL (ref 0.5–1.3)
D DIMER PPP FEU-MCNC: 290 NG/ML FEU
EOSINOPHIL # BLD AUTO: 0.1 X10*3/UL (ref 0–0.7)
EOSINOPHIL NFR BLD AUTO: 0.9 %
ERYTHROCYTE [DISTWIDTH] IN BLOOD BY AUTOMATED COUNT: 13 % (ref 11.5–14.5)
FLUAV RNA RESP QL NAA+PROBE: NOT DETECTED
FLUBV RNA RESP QL NAA+PROBE: NOT DETECTED
GFR SERPL CREATININE-BSD FRML MDRD: >90 ML/MIN/1.73M*2
GLUCOSE SERPL-MCNC: 163 MG/DL (ref 74–99)
HCT VFR BLD AUTO: 48.6 % (ref 41–52)
HGB BLD-MCNC: 16.1 G/DL (ref 13.5–17.5)
IMM GRANULOCYTES # BLD AUTO: 0.04 X10*3/UL (ref 0–0.7)
IMM GRANULOCYTES NFR BLD AUTO: 0.4 % (ref 0–0.9)
INR PPP: 1.3 (ref 0.9–1.1)
LYMPHOCYTES # BLD AUTO: 1.38 X10*3/UL (ref 1.2–4.8)
LYMPHOCYTES NFR BLD AUTO: 12.5 %
MAGNESIUM SERPL-MCNC: 1.74 MG/DL (ref 1.6–2.4)
MCH RBC QN AUTO: 30.4 PG (ref 26–34)
MCHC RBC AUTO-ENTMCNC: 33.1 G/DL (ref 32–36)
MCV RBC AUTO: 92 FL (ref 80–100)
MONOCYTES # BLD AUTO: 0.72 X10*3/UL (ref 0.1–1)
MONOCYTES NFR BLD AUTO: 6.5 %
NEUTROPHILS # BLD AUTO: 8.75 X10*3/UL (ref 1.2–7.7)
NEUTROPHILS NFR BLD AUTO: 79.2 %
NRBC BLD-RTO: 0 /100 WBCS (ref 0–0)
PLATELET # BLD AUTO: 375 X10*3/UL (ref 150–450)
POTASSIUM SERPL-SCNC: 3.5 MMOL/L (ref 3.5–5.3)
PROT SERPL-MCNC: 7 G/DL (ref 6.4–8.2)
PROTHROMBIN TIME: 14.3 SECONDS (ref 9.8–12.8)
RBC # BLD AUTO: 5.3 X10*6/UL (ref 4.5–5.9)
SARS-COV-2 RNA RESP QL NAA+PROBE: NOT DETECTED
SODIUM SERPL-SCNC: 138 MMOL/L (ref 136–145)
WBC # BLD AUTO: 11 X10*3/UL (ref 4.4–11.3)

## 2024-01-05 PROCEDURE — 36415 COLL VENOUS BLD VENIPUNCTURE: CPT

## 2024-01-05 PROCEDURE — 99284 EMERGENCY DEPT VISIT MOD MDM: CPT | Performed by: EMERGENCY MEDICINE

## 2024-01-05 PROCEDURE — 84484 ASSAY OF TROPONIN QUANT: CPT

## 2024-01-05 PROCEDURE — 93005 ELECTROCARDIOGRAM TRACING: CPT

## 2024-01-05 PROCEDURE — 85025 COMPLETE CBC W/AUTO DIFF WBC: CPT

## 2024-01-05 PROCEDURE — 85379 FIBRIN DEGRADATION QUANT: CPT

## 2024-01-05 PROCEDURE — 2500000001 HC RX 250 WO HCPCS SELF ADMINISTERED DRUGS (ALT 637 FOR MEDICARE OP)

## 2024-01-05 PROCEDURE — 85610 PROTHROMBIN TIME: CPT

## 2024-01-05 PROCEDURE — 83735 ASSAY OF MAGNESIUM: CPT

## 2024-01-05 PROCEDURE — 71045 X-RAY EXAM CHEST 1 VIEW: CPT | Performed by: RADIOLOGY

## 2024-01-05 PROCEDURE — 71045 X-RAY EXAM CHEST 1 VIEW: CPT

## 2024-01-05 PROCEDURE — 99285 EMERGENCY DEPT VISIT HI MDM: CPT | Performed by: EMERGENCY MEDICINE

## 2024-01-05 PROCEDURE — 80053 COMPREHEN METABOLIC PANEL: CPT

## 2024-01-05 PROCEDURE — 87636 SARSCOV2 & INF A&B AMP PRB: CPT

## 2024-01-05 RX ORDER — NITROGLYCERIN 0.4 MG/1
0.4 TABLET SUBLINGUAL ONCE
Status: COMPLETED | OUTPATIENT
Start: 2024-01-05 | End: 2024-01-05

## 2024-01-05 RX ADMIN — NITROGLYCERIN 0.4 MG: 0.4 TABLET SUBLINGUAL at 07:44

## 2024-01-05 ASSESSMENT — COLUMBIA-SUICIDE SEVERITY RATING SCALE - C-SSRS
1. IN THE PAST MONTH, HAVE YOU WISHED YOU WERE DEAD OR WISHED YOU COULD GO TO SLEEP AND NOT WAKE UP?: NO
2. HAVE YOU ACTUALLY HAD ANY THOUGHTS OF KILLING YOURSELF?: NO
6. HAVE YOU EVER DONE ANYTHING, STARTED TO DO ANYTHING, OR PREPARED TO DO ANYTHING TO END YOUR LIFE?: NO

## 2024-01-05 ASSESSMENT — LIFESTYLE VARIABLES
EVER HAD A DRINK FIRST THING IN THE MORNING TO STEADY YOUR NERVES TO GET RID OF A HANGOVER: NO
EVER FELT BAD OR GUILTY ABOUT YOUR DRINKING: NO
HAVE YOU EVER FELT YOU SHOULD CUT DOWN ON YOUR DRINKING: NO
HAVE PEOPLE ANNOYED YOU BY CRITICIZING YOUR DRINKING: NO
REASON UNABLE TO ASSESS: NO

## 2024-01-05 ASSESSMENT — HEART SCORE
HISTORY: MODERATELY SUSPICIOUS
HEART SCORE: 3
TROPONIN: LESS THAN OR EQUAL TO NORMAL LIMIT
RISK FACTORS: 1-2 RISK FACTORS
AGE: 45-64
ECG: NORMAL

## 2024-01-05 ASSESSMENT — PAIN SCALES - GENERAL
PAINLEVEL_OUTOF10: 0 - NO PAIN
PAINLEVEL_OUTOF10: 5 - MODERATE PAIN

## 2024-01-05 ASSESSMENT — PAIN DESCRIPTION - DESCRIPTORS: DESCRIPTORS: PRESSURE

## 2024-01-05 ASSESSMENT — PAIN - FUNCTIONAL ASSESSMENT: PAIN_FUNCTIONAL_ASSESSMENT: 0-10

## 2024-01-05 NOTE — ED PROVIDER NOTES
EMERGENCY DEPARTMENT ENCOUNTER      Pt Name: Mike Parkinson  MRN: [id number]  Birthdate 1960  Date of evaluation: 1/5/2024  Provider: Mika Sheldon MD    CHIEF COMPLAINT       Chief Complaint   Patient presents with    Dizziness         HISTORY OF PRESENT ILLNESS    HPI  Patient is a 63-year-old male with a history of hypertension presenting with lightheadedness and chest tightness.  This started acutely approximately 1.5 hours ago upon awakening.  He states he has a generalized feeling of fatigue and malaise, no appetite with mild nausea and unable to eat his breakfast this morning.  He has been lightheaded with subtle blurry vision.  There is no associated shortness of breath.  He denies any upper respiratory symptoms or fever.    Nursing Notes were reviewed.    PAST MEDICAL HISTORY     Past Medical History:   Diagnosis Date    Acute conjunctivitis 05/22/2023    Bilateral impacted cerumen 05/22/2023    Chest pain 05/22/2023    Coronavirus infection, unspecified 05/22/2023    Eye pain 05/22/2023    Leukemoid reaction 05/22/2023    Other pruritus 07/09/2022    Pruritic erythematous rash    Pruritic erythematous rash 05/22/2023    Skin infection, bacterial 05/22/2023    Viral URI with cough 05/22/2023         SURGICAL HISTORY     No past surgical history on file.      CURRENT MEDICATIONS       Previous Medications    BENAZEPRIL (LOTENSIN) 20 MG TABLET    Take 1 tablet (20 mg) by mouth once daily.    FLUTICASONE (FLONASE) 50 MCG/ACTUATION NASAL SPRAY    Administer 1 spray into each nostril once daily. Shake gently. Before first use, prime pump. After use, clean tip and replace cap.    PANTOPRAZOLE (PROTONIX) 20 MG EC TABLET    Take 1 tablet (20 mg) by mouth once daily.       ALLERGIES     Penicillins and Ondansetron    FAMILY HISTORY     No family history on file.       SOCIAL HISTORY       Social History     Socioeconomic History    Marital status:      Spouse name: Not on file    Number of children: Not  on file    Years of education: Not on file    Highest education level: Not on file   Occupational History    Not on file   Tobacco Use    Smoking status: Never    Smokeless tobacco: Never   Substance and Sexual Activity    Alcohol use: Not on file    Drug use: Not on file    Sexual activity: Not on file   Other Topics Concern    Not on file   Social History Narrative    Not on file     Social Determinants of Health     Financial Resource Strain: Not on file   Food Insecurity: Not on file   Transportation Needs: Not on file   Physical Activity: Not on file   Stress: Not on file   Social Connections: Not on file   Intimate Partner Violence: Not on file   Housing Stability: Not on file       SCREENINGS                        PHYSICAL EXAM    (up to 7 for level 4, 8 or more for level 5)     ED Triage Vitals [01/05/24 0719]   Temp Heart Rate Resp BP   36.3 °C (97.3 °F) 98 18 (!) 199/108      SpO2 Temp Source Heart Rate Source Patient Position   98 % Temporal Monitor --      BP Location FiO2 (%)     -- --       Physical Exam  Vitals and nursing note reviewed.   Constitutional:       General: He is not in acute distress.     Appearance: He is not toxic-appearing.   HENT:      Head: Normocephalic and atraumatic.      Nose: Nose normal. No congestion or rhinorrhea.      Mouth/Throat:      Mouth: Mucous membranes are moist.      Pharynx: Oropharynx is clear.   Eyes:      General:         Right eye: No discharge.         Left eye: No discharge.      Extraocular Movements: Extraocular movements intact.      Conjunctiva/sclera: Conjunctivae normal.   Cardiovascular:      Rate and Rhythm: Normal rate and regular rhythm.      Pulses: Normal pulses.      Heart sounds: Normal heart sounds.   Pulmonary:      Effort: Pulmonary effort is normal. No respiratory distress.      Breath sounds: Normal breath sounds.   Abdominal:      General: There is no distension.      Palpations: Abdomen is soft.   Musculoskeletal:         General: No  swelling, deformity or signs of injury. Normal range of motion.      Cervical back: Normal range of motion and neck supple.      Right lower leg: No edema.      Left lower leg: No edema.   Skin:     General: Skin is warm and dry.   Neurological:      General: No focal deficit present.      Mental Status: Mental status is at baseline.          DIAGNOSTIC RESULTS     LABS:  Labs Reviewed   CBC WITH AUTO DIFFERENTIAL - Abnormal       Result Value    WBC 11.0      nRBC 0.0      RBC 5.30      Hemoglobin 16.1      Hematocrit 48.6      MCV 92      MCH 30.4      MCHC 33.1      RDW 13.0      Platelets 375      Neutrophils % 79.2      Immature Granulocytes %, Automated 0.4      Lymphocytes % 12.5      Monocytes % 6.5      Eosinophils % 0.9      Basophils % 0.5      Neutrophils Absolute 8.75 (*)     Immature Granulocytes Absolute, Automated 0.04      Lymphocytes Absolute 1.38      Monocytes Absolute 0.72      Eosinophils Absolute 0.10      Basophils Absolute 0.05     COMPREHENSIVE METABOLIC PANEL - Abnormal    Glucose 163 (*)     Sodium 138      Potassium 3.5      Chloride 106      Bicarbonate 22      Anion Gap 14      Urea Nitrogen 11      Creatinine 0.85      eGFR >90      Calcium 8.8      Albumin 4.5      Alkaline Phosphatase 71      Total Protein 7.0      AST 22      Bilirubin, Total 0.6      ALT 23     PROTIME-INR - Abnormal    Protime 14.3 (*)     INR 1.3 (*)    MAGNESIUM - Normal    Magnesium 1.74     SARS-COV-2 AND INFLUENZA A/B PCR - Normal    Flu A Result Not Detected      Flu B Result Not Detected      Coronavirus 2019, PCR Not Detected      Narrative:     This assay has received FDA Emergency Use Authorization (EUA) and  is only authorized for the duration of time that circumstances exist to justify the authorization of the emergency use of in vitro diagnostic tests for the detection of SARS-CoV-2 virus and/or diagnosis of COVID-19 infection under section 564(b)(1) of the Act, 21 U.S.C. 360bbb-3(b)(1). Testing for  SARS-CoV-2 is only recommended for patients who meet current clinical and/or epidemiological criteria as defined by federal, state, or local public health directives. This assay is an in vitro diagnostic nucleic acid amplification test for the qualitative detection of SARS-CoV-2, Influenza A, and Influenza B from nasopharyngeal specimens and has been validated for use at [facility]. Negative results do not preclude COVID-19 infections or Influenza A/B infections, and should not be used as the sole basis for diagnosis, treatment, or other management decisions. If Influenza A/B and RSV PCR results are negative, testing for Parainfluenza virus, Adenovirus and Metapneumovirus is routinely performed for [facility] pediatric oncology and intensive care inpatients, and is available on other patients by placing an add-on request.    D-DIMER, VTE EXCLUSION - Normal    D-Dimer, Quantitative VTE Exclusion 290      Narrative:     The VTE Exclusion D-Dimer assay is reported in ng/mL Fibrinogen Equivalent Units (FEU).    Per 's instructions for use, a value of less than 500 ng/mL (FEU) may help to exclude DVT or PE in outpatients when the assay is used with a clinical pretest probability assessment.(AE must utilize and document eCalc 'Wells Score Deep Vein Thrombosis Risk' for DVT exclusion only. Emergency Department should utilize  Guidelines for Emergency Department Use of the VTE Exclusion D-Dimer and Clinical Pretest probability assessment model for DVT or PE exclusion.)   SERIAL TROPONIN-INITIAL - Normal    Troponin I, High Sensitivity 3      Narrative:     Less than 99th percentile of normal range cutoff-  Female and children under 18 years old <14 ng/L; Male <21 ng/L: Negative  Repeat testing should be performed if clinically indicated.     Female and children under 18 years old 14-50 ng/L; Male 21-50 ng/L:  Consistent with possible cardiac damage and possible increased clinical   risk.  Serial measurements may help to assess extent of myocardial damage.     >50 ng/L: Consistent with cardiac damage, increased clinical risk and  myocardial infarction. Serial measurements may help assess extent of   myocardial damage.      NOTE: Children less than 1 year old may have higher baseline troponin   levels and results should be interpreted in conjunction with the overall   clinical context.     NOTE: Troponin I testing is performed using a different   testing methodology at [facility] than at other   [facility]. Direct result comparisons should only   be made within the same method.   SERIAL TROPONIN, 1 HOUR - Normal    Troponin I, High Sensitivity 9      Narrative:     Less than 99th percentile of normal range cutoff-  Female and children under 18 years old <14 ng/L; Male <21 ng/L: Negative  Repeat testing should be performed if clinically indicated.     Female and children under 18 years old 14-50 ng/L; Male 21-50 ng/L:  Consistent with possible cardiac damage and possible increased clinical   risk. Serial measurements may help to assess extent of myocardial damage.     >50 ng/L: Consistent with cardiac damage, increased clinical risk and  myocardial infarction. Serial measurements may help assess extent of   myocardial damage.      NOTE: Children less than 1 year old may have higher baseline troponin   levels and results should be interpreted in conjunction with the overall   clinical context.     NOTE: Troponin I testing is performed using a different   testing methodology at [facility] than at other   [facility]. Direct result comparisons should only   be made within the same method.   TROPONIN SERIES- (INITIAL, 1 HR)    Narrative:     The following orders were created for panel order Troponin I Series, High Sensitivity (0, 1 HR).  Procedure                               Abnormality         Status                     ---------                                -----------         ------                     Troponin I, High Sensitiv...[[id number]]  Normal              Final result               Troponin, High Sensitivi...[[id number]]  Normal              Final result                 Please view results for these tests on the individual orders.       All other labs were within normal range or not returned as of this dictation.    Imaging  XR chest 1 view   Final Result   1.  No evidence of acute cardiopulmonary process.                  MACRO:   None.        Signed by: Dax Guaman 1/5/2024 8:24 AM   Dictation workstation:   [id number]           Procedures  Procedures     EMERGENCY DEPARTMENT COURSE/MDM:     Diagnoses as of 01/05/24 1557   Lightheadedness   Hypertension, unspecified type        Medical Decision Making  History obtained for the patient.  Records including labs, imaging, notes reviewed.  Given patient's constellation of symptoms, concern for possible ACS.  Cardiac labs were sent.  Troponin series unremarkable.  EKG without acute injury pattern.  CBC unremarkable.  No acute electrolyte changes.  Glucose elevated at 163.  Influenza and COVID-negative.  Chest x-ray without acute cardiopulmonary findings.  Patient was given 1 sublingual nitro with substantial improvement in his symptoms and hypertension.  Given patient's heart score of 2-3 given how his story was evaluated, patient was discharged home in satisfactory condition.  He was instructed to follow-up with his primary care provider to discuss alterations to his blood pressure medications, as his hypertension may have caused his symptoms today.  All questions answered and return precautions discussed.    Patient and or family in agreement and understanding of treatment plan.  All questions answered.      I reviewed the case with the attending ED physician. The attending ED physician agrees with the plan. Patient and/or patient´s representative was counseled regarding labs, imaging, likely diagnosis, and  plan. All questions were answered.    ED Medications administered this visit:    Medications   nitroglycerin (Nitrostat) SL tablet 0.4 mg (has no administration in time range)       New Prescriptions from this visit:    New Prescriptions    No medications on file       Follow-up:  Wing Zamorano DO  [id number]  [facility], [address]  [facility] [address] [phone]    Schedule an appointment as soon as possible for a visit           Final Impression:   1. Lightheadedness    2. Hypertension, unspecified type          (Please note that portions of this note were completed with a voice recognition program.  Efforts were made to edit the dictations but occasionally words are mis-transcribed.)     Mika Sheldon MD  Resident  01/05/24 3120

## 2024-01-05 NOTE — PROGRESS NOTES
I reviewed the resident/fellow's documentation and discussed the patient with the resident/fellow. I agree with the resident/fellow's medical decision making as documented in the note.     Omar Tian MD

## 2024-01-05 NOTE — DISCHARGE INSTRUCTIONS
You were evaluated for lightheadedness and chest pressure.  Your cardiac workup was unremarkable.  That being said, your blood pressure and chest tightness responded to nitroglycerin.  We recommend that you keep a daily journal of your blood pressure and stay on your current medications.  However, you should return to your primary care provider at your earliest convenience to discuss potential medication adjustments.  In the meantime, if you develop crushing chest pain, shortness of breath, intractable vomiting, sweatiness, or other worrisome symptoms, return to the emergency department

## 2024-01-08 ENCOUNTER — OFFICE VISIT (OUTPATIENT)
Dept: PRIMARY CARE | Facility: CLINIC | Age: 64
End: 2024-01-08
Payer: COMMERCIAL

## 2024-01-08 VITALS
WEIGHT: 173 LBS | SYSTOLIC BLOOD PRESSURE: 138 MMHG | TEMPERATURE: 98 F | DIASTOLIC BLOOD PRESSURE: 80 MMHG | HEART RATE: 63 BPM | OXYGEN SATURATION: 99 % | BODY MASS INDEX: 26.22 KG/M2 | HEIGHT: 68 IN | RESPIRATION RATE: 18 BRPM

## 2024-01-08 DIAGNOSIS — I10 BENIGN HYPERTENSION: ICD-10-CM

## 2024-01-08 DIAGNOSIS — I20.89 ATYPICAL ANGINA (CMS-HCC): Primary | ICD-10-CM

## 2024-01-08 PROBLEM — K44.9 HIATAL HERNIA: Status: ACTIVE | Noted: 2024-01-08

## 2024-01-08 PROBLEM — H61.20 IMPACTED CERUMEN: Status: RESOLVED | Noted: 2024-01-08 | Resolved: 2024-01-08

## 2024-01-08 PROBLEM — K58.9 IRRITABLE BOWEL SYNDROME: Status: ACTIVE | Noted: 2023-02-03

## 2024-01-08 PROCEDURE — 3075F SYST BP GE 130 - 139MM HG: CPT

## 2024-01-08 PROCEDURE — 1036F TOBACCO NON-USER: CPT

## 2024-01-08 PROCEDURE — 3079F DIAST BP 80-89 MM HG: CPT

## 2024-01-08 PROCEDURE — 99214 OFFICE O/P EST MOD 30 MIN: CPT

## 2024-01-08 RX ORDER — NITROGLYCERIN 0.4 MG/1
0.4 TABLET SUBLINGUAL EVERY 5 MIN PRN
Qty: 30 TABLET | Refills: 0 | Status: SHIPPED | OUTPATIENT
Start: 2024-01-08 | End: 2024-01-08 | Stop reason: ENTERED-IN-ERROR

## 2024-01-08 RX ORDER — AMLODIPINE AND BENAZEPRIL HYDROCHLORIDE 5; 20 MG/1; MG/1
1 CAPSULE ORAL DAILY
Qty: 30 CAPSULE | Refills: 11 | Status: SHIPPED | OUTPATIENT
Start: 2024-01-08 | End: 2024-02-27 | Stop reason: SDUPTHER

## 2024-01-08 ASSESSMENT — ENCOUNTER SYMPTOMS
PALPITATIONS: 0
LIGHT-HEADEDNESS: 0
SHORTNESS OF BREATH: 0
DIZZINESS: 0
CHEST TIGHTNESS: 0

## 2024-01-08 NOTE — PROGRESS NOTES
"Subjective   Mike Parkinson is a 63 y.o. male who presents for Hospital Follow-up ([facility] ER follow up HTN.).    HPI    Mike is here for ED follow up went to ED 3 days ago for chest pain rule out. Resolved with nitroglycerin. Lab work reassuring. He was discharged home and recommended PCP follow up.  Chest pain occurred in sleep, substernal.   He is taking BP at home ranging mostly 140/ 90.    No further chest, SOB, dizziness, lightheadedness.    Review of Systems   Respiratory:  Negative for chest tightness and shortness of breath.    Cardiovascular:  Negative for chest pain, palpitations and leg swelling.   Neurological:  Negative for dizziness and light-headedness.     Objective     /80 (BP Location: Left arm, Patient Position: Sitting)   Pulse 63   Temp 36.7 °C (98 °F)   Resp 18   Ht 1.727 m (5' 8\")   Wt 78.5 kg (173 lb)   SpO2 99%   BMI 26.30 kg/m²     Physical Exam  Vitals reviewed.   Constitutional:       General: He is not in acute distress.     Appearance: Normal appearance.   HENT:      Head: Normocephalic.   Eyes:      Extraocular Movements: Extraocular movements intact.      Pupils: Pupils are equal, round, and reactive to light.   Cardiovascular:      Rate and Rhythm: Normal rate and regular rhythm.      Pulses: Normal pulses.      Heart sounds: Normal heart sounds. No murmur heard.  Pulmonary:      Effort: Pulmonary effort is normal.      Breath sounds: Normal breath sounds. No wheezing, rhonchi or rales.   Musculoskeletal:      Right lower leg: No edema.      Left lower leg: No edema.   Neurological:      Mental Status: He is alert.   Psychiatric:         Mood and Affect: Mood normal.       Assessment/Plan   Problem List Items Addressed This Visit             ICD-10-CM    Benign hypertension I10    Relevant Medications    amLODIPine-benazepriL (LotreL) 5-20 mg capsule     Other Visit Diagnoses         Codes    Atypical angina    -  Primary I20.89    Relevant Orders    Stress Test      "   Patient presenting after ED follow-up for ACS rule out.  Appears chest pain is atypical in nature meeting 1 out of 3 criteria: Resolving with nitroglycerin.  Given his family history and history of high blood pressure, will further assess with treadmill stress test.  Blood pressure was elevated in the office today in the 160s, improved on repeat.  Given blood pressures at home ranging mostly in the 140s, will add amlodipine 5 to current blood pressure regimen.  Discussed as needed nitroglycerin for substernal chest pain, however patient declined at this time.  ED criteria for chest pain discussed.  Follow-up follow-up after treadmill stress test.  Consider cardiology referral.    Discussed with Attending,    Roselia Watt, DO PGY-3

## 2024-01-08 NOTE — PATIENT INSTRUCTIONS
It was nice seeing you in the office today.  Sign up for [employer] to get results instantly.    Schedule stress test.  Take nitroglycerin if having similar chest pain as 3 days ago. Any chest pain that is substernal, radiating to left arm or jaw. If you take a nitroglycerin, seek care in the ED after taking.  Follow up after stress test.    Call the office: [phone] for questions or concerns.  Please allow 48-72 hours for medication refills.

## 2024-01-12 NOTE — PROGRESS NOTES
I saw and evaluated the patient. I personally obtained the key and critical portions of the history and physical exam or was physically present for key and critical portions performed by the resident/fellow. I reviewed the resident/fellow's documentation and discussed the patient with the resident/fellow. I agree with the resident/fellow's medical decision making as documented in the note.    Wing Zamorano, DO

## 2024-01-27 LAB
ATRIAL RATE: 72 BPM
ATRIAL RATE: 91 BPM
P AXIS: 64 DEGREES
P AXIS: 67 DEGREES
P OFFSET: 181 MS
P OFFSET: 184 MS
P ONSET: 126 MS
P ONSET: 126 MS
PR INTERVAL: 166 MS
PR INTERVAL: 170 MS
Q ONSET: 209 MS
Q ONSET: 211 MS
QRS COUNT: 11 BEATS
QRS COUNT: 15 BEATS
QRS DURATION: 88 MS
QRS DURATION: 92 MS
QT INTERVAL: 376 MS
QT INTERVAL: 422 MS
QTC CALCULATION(BAZETT): 462 MS
QTC CALCULATION(BAZETT): 462 MS
QTC FREDERICIA: 432 MS
QTC FREDERICIA: 448 MS
R AXIS: -5 DEGREES
R AXIS: -9 DEGREES
T AXIS: 37 DEGREES
T AXIS: 59 DEGREES
T OFFSET: 397 MS
T OFFSET: 422 MS
VENTRICULAR RATE: 72 BPM
VENTRICULAR RATE: 91 BPM

## 2024-02-06 ENCOUNTER — LAB (OUTPATIENT)
Dept: LAB | Facility: LAB | Age: 64
End: 2024-02-06
Payer: COMMERCIAL

## 2024-02-06 ENCOUNTER — OFFICE VISIT (OUTPATIENT)
Dept: PRIMARY CARE | Facility: CLINIC | Age: 64
End: 2024-02-06
Payer: COMMERCIAL

## 2024-02-06 ENCOUNTER — HOSPITAL ENCOUNTER (OUTPATIENT)
Dept: RADIOLOGY | Facility: CLINIC | Age: 64
Discharge: HOME | End: 2024-02-06
Payer: COMMERCIAL

## 2024-02-06 VITALS
SYSTOLIC BLOOD PRESSURE: 120 MMHG | RESPIRATION RATE: 16 BRPM | DIASTOLIC BLOOD PRESSURE: 78 MMHG | HEIGHT: 68 IN | HEART RATE: 64 BPM | OXYGEN SATURATION: 98 % | TEMPERATURE: 97.7 F | BODY MASS INDEX: 26.71 KG/M2 | WEIGHT: 176.25 LBS

## 2024-02-06 DIAGNOSIS — I10 BENIGN HYPERTENSION: ICD-10-CM

## 2024-02-06 DIAGNOSIS — Z12.5 SCREENING PSA (PROSTATE SPECIFIC ANTIGEN): ICD-10-CM

## 2024-02-06 DIAGNOSIS — Z00.00 HEALTH MAINTENANCE EXAMINATION: ICD-10-CM

## 2024-02-06 DIAGNOSIS — Z00.00 HEALTH MAINTENANCE EXAMINATION: Primary | ICD-10-CM

## 2024-02-06 DIAGNOSIS — M25.60 MORNING STIFFNESS OF JOINTS: ICD-10-CM

## 2024-02-06 DIAGNOSIS — F41.9 ANXIETY: ICD-10-CM

## 2024-02-06 DIAGNOSIS — M54.42 CHRONIC BILATERAL LOW BACK PAIN WITH BILATERAL SCIATICA: ICD-10-CM

## 2024-02-06 DIAGNOSIS — M54.41 CHRONIC BILATERAL LOW BACK PAIN WITH BILATERAL SCIATICA: ICD-10-CM

## 2024-02-06 DIAGNOSIS — G89.29 CHRONIC BILATERAL LOW BACK PAIN WITH BILATERAL SCIATICA: ICD-10-CM

## 2024-02-06 DIAGNOSIS — K21.9 GASTROESOPHAGEAL REFLUX DISEASE WITHOUT ESOPHAGITIS: ICD-10-CM

## 2024-02-06 PROBLEM — J30.2 SEASONAL ALLERGIC RHINITIS: Status: ACTIVE | Noted: 2024-02-06

## 2024-02-06 PROBLEM — F45.8 BRUXISM: Status: ACTIVE | Noted: 2024-02-06

## 2024-02-06 PROBLEM — G80.9 CEREBRAL PALSY (MULTI): Status: RESOLVED | Noted: 2024-02-06 | Resolved: 2024-02-06

## 2024-02-06 PROBLEM — E87.1 ACUTE HYPONATREMIA: Status: RESOLVED | Noted: 2023-06-29 | Resolved: 2024-02-06

## 2024-02-06 LAB
ALBUMIN SERPL BCP-MCNC: 4.5 G/DL (ref 3.4–5)
ALP SERPL-CCNC: 75 U/L (ref 33–136)
ALT SERPL W P-5'-P-CCNC: 30 U/L (ref 10–52)
ANION GAP SERPL CALC-SCNC: 10 MMOL/L (ref 10–20)
AST SERPL W P-5'-P-CCNC: 24 U/L (ref 9–39)
BILIRUB SERPL-MCNC: 0.6 MG/DL (ref 0–1.2)
BUN SERPL-MCNC: 8 MG/DL (ref 6–23)
CALCIUM SERPL-MCNC: 9.4 MG/DL (ref 8.6–10.3)
CHLORIDE SERPL-SCNC: 101 MMOL/L (ref 98–107)
CHOLEST SERPL-MCNC: 178 MG/DL (ref 0–199)
CHOLESTEROL/HDL RATIO: 3.8
CO2 SERPL-SCNC: 28 MMOL/L (ref 21–32)
CREAT SERPL-MCNC: 0.78 MG/DL (ref 0.5–1.3)
EGFRCR SERPLBLD CKD-EPI 2021: >90 ML/MIN/1.73M*2
ERYTHROCYTE [DISTWIDTH] IN BLOOD BY AUTOMATED COUNT: 13.1 % (ref 11.5–14.5)
GLUCOSE SERPL-MCNC: 91 MG/DL (ref 74–99)
HCT VFR BLD AUTO: 46.3 % (ref 41–52)
HDLC SERPL-MCNC: 46.7 MG/DL
HGB BLD-MCNC: 15.2 G/DL (ref 13.5–17.5)
LDLC SERPL CALC-MCNC: 112 MG/DL
MCH RBC QN AUTO: 31.4 PG (ref 26–34)
MCHC RBC AUTO-ENTMCNC: 32.8 G/DL (ref 32–36)
MCV RBC AUTO: 96 FL (ref 80–100)
NON HDL CHOLESTEROL: 131 MG/DL (ref 0–149)
NRBC BLD-RTO: 0 /100 WBCS (ref 0–0)
PLATELET # BLD AUTO: 343 X10*3/UL (ref 150–450)
POTASSIUM SERPL-SCNC: 4.8 MMOL/L (ref 3.5–5.3)
PROT SERPL-MCNC: 6.7 G/DL (ref 6.4–8.2)
PSA SERPL-MCNC: 1.17 NG/ML
RBC # BLD AUTO: 4.84 X10*6/UL (ref 4.5–5.9)
SODIUM SERPL-SCNC: 134 MMOL/L (ref 136–145)
TRIGL SERPL-MCNC: 96 MG/DL (ref 0–149)
VLDL: 19 MG/DL (ref 0–40)
WBC # BLD AUTO: 9.1 X10*3/UL (ref 4.4–11.3)

## 2024-02-06 PROCEDURE — 36415 COLL VENOUS BLD VENIPUNCTURE: CPT

## 2024-02-06 PROCEDURE — 85027 COMPLETE CBC AUTOMATED: CPT

## 2024-02-06 PROCEDURE — 73130 X-RAY EXAM OF HAND: CPT | Mod: 50

## 2024-02-06 PROCEDURE — 73130 X-RAY EXAM OF HAND: CPT | Mod: BILATERAL PROCEDURE | Performed by: RADIOLOGY

## 2024-02-06 PROCEDURE — 80061 LIPID PANEL: CPT

## 2024-02-06 PROCEDURE — 3078F DIAST BP <80 MM HG: CPT

## 2024-02-06 PROCEDURE — 80053 COMPREHEN METABOLIC PANEL: CPT

## 2024-02-06 PROCEDURE — 84153 ASSAY OF PSA TOTAL: CPT

## 2024-02-06 PROCEDURE — 99396 PREV VISIT EST AGE 40-64: CPT

## 2024-02-06 PROCEDURE — 1036F TOBACCO NON-USER: CPT

## 2024-02-06 PROCEDURE — 3074F SYST BP LT 130 MM HG: CPT

## 2024-02-06 SDOH — ECONOMIC STABILITY: FOOD INSECURITY: WITHIN THE PAST 12 MONTHS, YOU WORRIED THAT YOUR FOOD WOULD RUN OUT BEFORE YOU GOT MONEY TO BUY MORE.: NEVER TRUE

## 2024-02-06 SDOH — ECONOMIC STABILITY: HOUSING INSECURITY
IN THE LAST 12 MONTHS, WAS THERE A TIME WHEN YOU DID NOT HAVE A STEADY PLACE TO SLEEP OR SLEPT IN A SHELTER (INCLUDING NOW)?: NO

## 2024-02-06 SDOH — ECONOMIC STABILITY: FOOD INSECURITY: WITHIN THE PAST 12 MONTHS, THE FOOD YOU BOUGHT JUST DIDN'T LAST AND YOU DIDN'T HAVE MONEY TO GET MORE.: NEVER TRUE

## 2024-02-06 SDOH — ECONOMIC STABILITY: INCOME INSECURITY: IN THE LAST 12 MONTHS, WAS THERE A TIME WHEN YOU WERE NOT ABLE TO PAY THE MORTGAGE OR RENT ON TIME?: NO

## 2024-02-06 SDOH — ECONOMIC STABILITY: GENERAL
WHICH OF THE FOLLOWING DO YOU KNOW HOW TO USE AND HAVE ACCESS TO EVERY DAY? (CHOOSE ALL THAT APPLY): DESKTOP COMPUTER, LAPTOP COMPUTER, OR TABLET WITH BROADBAND INTERNET CONNECTION;SMARTPHONE WITH CELLULAR DATA PLAN

## 2024-02-06 SDOH — HEALTH STABILITY: PHYSICAL HEALTH: ON AVERAGE, HOW MANY MINUTES DO YOU ENGAGE IN EXERCISE AT THIS LEVEL?: 30 MIN

## 2024-02-06 SDOH — ECONOMIC STABILITY: TRANSPORTATION INSECURITY
IN THE PAST 12 MONTHS, HAS THE LACK OF TRANSPORTATION KEPT YOU FROM MEDICAL APPOINTMENTS OR FROM GETTING MEDICATIONS?: NO

## 2024-02-06 SDOH — ECONOMIC STABILITY: GENERAL
WHICH OF THE FOLLOWING WOULD YOU LIKE TO GET CONNECTED TO IN ORDER TO RECEIVE A DISCOUNT OR FOR FREE? (CHOOSE ALL THAT APPLY): NONE OF THESE

## 2024-02-06 SDOH — HEALTH STABILITY: PHYSICAL HEALTH: ON AVERAGE, HOW MANY DAYS PER WEEK DO YOU ENGAGE IN MODERATE TO STRENUOUS EXERCISE (LIKE A BRISK WALK)?: 3 DAYS

## 2024-02-06 SDOH — ECONOMIC STABILITY: TRANSPORTATION INSECURITY
IN THE PAST 12 MONTHS, HAS LACK OF TRANSPORTATION KEPT YOU FROM MEETINGS, WORK, OR FROM GETTING THINGS NEEDED FOR DAILY LIVING?: NO

## 2024-02-06 ASSESSMENT — ENCOUNTER SYMPTOMS
ACTIVITY CHANGE: 0
FEVER: 0
BACK PAIN: 1
WEAKNESS: 0
APPETITE CHANGE: 0
DEPRESSION: 0
NERVOUS/ANXIOUS: 0
ARTHRALGIAS: 1
DIFFICULTY URINATING: 0
DIARRHEA: 0
CHILLS: 0
HEADACHES: 1
SLEEP DISTURBANCE: 0
PALPITATIONS: 0
CONSTIPATION: 0
LOSS OF SENSATION IN FEET: 0
OCCASIONAL FEELINGS OF UNSTEADINESS: 0
SHORTNESS OF BREATH: 0
ABDOMINAL PAIN: 0

## 2024-02-06 ASSESSMENT — SOCIAL DETERMINANTS OF HEALTH (SDOH)
HOW OFTEN DO YOU ATTENT MEETINGS OF THE CLUB OR ORGANIZATION YOU BELONG TO?: MORE THAN 4 TIMES PER YEAR
IN A TYPICAL WEEK, HOW MANY TIMES DO YOU TALK ON THE PHONE WITH FAMILY, FRIENDS, OR NEIGHBORS?: MORE THAN THREE TIMES A WEEK
HOW OFTEN DO YOU ATTEND CHURCH OR RELIGIOUS SERVICES?: NEVER
WITHIN THE LAST YEAR, HAVE YOU BEEN AFRAID OF YOUR PARTNER OR EX-PARTNER?: NO
HOW HARD IS IT FOR YOU TO PAY FOR THE VERY BASICS LIKE FOOD, HOUSING, MEDICAL CARE, AND HEATING?: NOT HARD AT ALL
WITHIN THE LAST YEAR, HAVE TO BEEN RAPED OR FORCED TO HAVE ANY KIND OF SEXUAL ACTIVITY BY YOUR PARTNER OR EX-PARTNER?: NO
WITHIN THE LAST YEAR, HAVE YOU BEEN KICKED, HIT, SLAPPED, OR OTHERWISE PHYSICALLY HURT BY YOUR PARTNER OR EX-PARTNER?: NO
WITHIN THE LAST YEAR, HAVE YOU BEEN HUMILIATED OR EMOTIONALLY ABUSED IN OTHER WAYS BY YOUR PARTNER OR EX-PARTNER?: NO
HOW OFTEN DO YOU GET TOGETHER WITH FRIENDS OR RELATIVES?: ONCE A WEEK
IN THE PAST 12 MONTHS, HAS THE ELECTRIC, GAS, OIL, OR WATER COMPANY THREATENED TO SHUT OFF SERVICE IN YOUR HOME?: NO
DO YOU BELONG TO ANY CLUBS OR ORGANIZATIONS SUCH AS CHURCH GROUPS UNIONS, FRATERNAL OR ATHLETIC GROUPS, OR SCHOOL GROUPS?: YES

## 2024-02-06 ASSESSMENT — LIFESTYLE VARIABLES
HOW OFTEN DO YOU HAVE A DRINK CONTAINING ALCOHOL: NEVER
HOW MANY STANDARD DRINKS CONTAINING ALCOHOL DO YOU HAVE ON A TYPICAL DAY: PATIENT DOES NOT DRINK

## 2024-02-06 NOTE — PROGRESS NOTES
Subjective   Mike Parkinson is a 63 y.o. male who presents for Annual Exam (Pt here today for Px).  HPI    No concerns today.    Last physical: > 1 year.  Last blood work: in the last 2 months.  Current meds: Reviewed and UTD.  Immunizations: needs shingles vaccine.  FHx: mother with stomach cancer, father with lung cancer.  Surgical Hx: fusion of cervical spine, tonsillectomy, hernia repair.  Sexual History: none  ETOH: occ.  Illicit Drug use: none    Tobacco screen: former, quit > 20 years.  Diet: breakfast, lunch with sandwich, salad and fruit.     Exercise: would like to get more, limited by back pain.     Occupation: .    Colonoscopy: done 2023 due 2030.  Prostate Screen: odered  Lung Cancer: na    AAA: na    BP at home 140/90. Checking first thing in the AM, sometimes in the PM and still high.    Last couple days of worsening gerd. Took an extra pantoprazole, lasted a few hours.    Getting a stress test done Friday.    Active Problem List  Chronic back pain  HTN  GERD    Comprehensive Medical/Surgical/Social/Family History  Past Medical History:   Diagnosis Date    Acute conjunctivitis 05/22/2023    Acute hyponatremia 06/29/2023    Bilateral impacted cerumen 05/22/2023    Cerebral palsy (CMS/HCC) 02/06/2024    Comment on above: CP    Chest pain 05/22/2023    Coronavirus infection, unspecified 05/22/2023    Eye pain 05/22/2023    Impacted cerumen 01/08/2024    Leukemoid reaction 05/22/2023    Other pruritus 07/09/2022    Pruritic erythematous rash    Pruritic erythematous rash 05/22/2023    Skin infection, bacterial 05/22/2023    Viral URI with cough 05/22/2023     History reviewed. No pertinent surgical history.  Social History     Social History Narrative    Not on file     Allergies and Medications  Penicillins and Ondansetron  Current Outpatient Medications on File Prior to Visit   Medication Sig Dispense Refill    amLODIPine-benazepriL (LotreL) 5-20 mg capsule Take 1 capsule by mouth once daily.  "30 capsule 11    fluticasone (Flonase) 50 mcg/actuation nasal spray Administer 1 spray into each nostril once daily. Shake gently. Before first use, prime pump. After use, clean tip and replace cap. 16 g 11    pantoprazole (ProtoNix) 20 mg EC tablet Take 1 tablet (20 mg) by mouth once daily. 180 tablet 0     No current facility-administered medications on file prior to visit.     Review of Systems   Constitutional:  Negative for activity change, appetite change, chills and fever.   Eyes:  Negative for visual disturbance.   Respiratory:  Negative for shortness of breath.    Cardiovascular:  Negative for chest pain, palpitations and leg swelling.   Gastrointestinal:  Negative for abdominal pain, constipation and diarrhea.   Genitourinary:  Negative for difficulty urinating.   Musculoskeletal:  Positive for arthralgias (hands) and back pain.   Neurological:  Positive for headaches (due to bruxism). Negative for weakness.   Psychiatric/Behavioral:  Negative for sleep disturbance. The patient is not nervous/anxious.      /78 (BP Location: Right arm, Patient Position: Sitting)   Pulse 64   Temp 36.5 °C (97.7 °F) (Temporal)   Resp 16   Ht 1.727 m (5' 8\")   Wt 79.9 kg (176 lb 4 oz)   SpO2 98%   BMI 26.80 kg/m²     Objective   Physical Exam  Vitals reviewed.   Constitutional:       General: He is not in acute distress.     Appearance: Normal appearance.   HENT:      Head: Normocephalic and atraumatic.      Right Ear: Tympanic membrane, ear canal and external ear normal.      Left Ear: Tympanic membrane, ear canal and external ear normal.      Mouth/Throat:      Mouth: Mucous membranes are moist.      Pharynx: Oropharynx is clear.      Comments: Poor dentition.  Eyes:      Extraocular Movements: Extraocular movements intact.      Pupils: Pupils are equal, round, and reactive to light.   Cardiovascular:      Rate and Rhythm: Normal rate and regular rhythm.      Pulses: Normal pulses.      Heart sounds: Normal " heart sounds. No murmur heard.  Pulmonary:      Effort: Pulmonary effort is normal.      Breath sounds: Normal breath sounds. No wheezing, rhonchi or rales.   Abdominal:      General: Bowel sounds are normal.      Palpations: Abdomen is soft.      Tenderness: There is no abdominal tenderness. There is no guarding or rebound.   Musculoskeletal:         General: Normal range of motion.      Cervical back: Normal range of motion and neck supple. No tenderness.      Right lower leg: No edema.      Left lower leg: No edema.      Comments: No sponginess to PIPS.   Skin:     General: Skin is warm and dry.      Capillary Refill: Capillary refill takes less than 2 seconds.   Neurological:      General: No focal deficit present.      Mental Status: He is alert.      Cranial Nerves: No cranial nerve deficit.      Motor: No weakness.      Gait: Gait normal.      Deep Tendon Reflexes: Reflexes normal.   Psychiatric:         Mood and Affect: Mood normal.       Assessment/Plan   Problem List Items Addressed This Visit       Anxiety    Chronic bilateral low back pain with bilateral sciatica    Gastroesophageal reflux disease without esophagitis    Benign hypertension    Relevant Orders    Lipid panel    Comprehensive metabolic panel    CBC     Other Visit Diagnoses       Health maintenance examination    -  Primary    Relevant Orders    Lipid panel    Comprehensive metabolic panel    CBC    Screening PSA (prostate specific antigen)        Relevant Orders    Prostate Spec.Ag,Screen    Morning stiffness of joints        Relevant Orders    XR hand 3+ views bilateral        Here for physical.  Normal vitals and physical exam.  Labs per above.  Xray for hand stiffness.    Recommend:  Seeing dentist twice a year.  Getting eyes checked yearly.  Application of sun screen every 2 hours during peak times.  Regular sleep schedule, at least 8 hours of sleep per night, limiting TV/screens 30 minutes prior to bedtime.  Limiting salt and  processed foods.  Recommended at least 150 minutes of moderate-intensity physical activity per week.  Recommended at least 2 days of muscle strengthening activity per week.     Follow up in 6 months, sooner if BP consistently > 140/90 or if new concerns arise.    Discussed with Attending,    Roselia Watt, DO PGY-3

## 2024-02-06 NOTE — PATIENT INSTRUCTIONS
It was nice seeing you in the office today.  Sign up for [employer] to get results instantly.  Obtain labs., fasting.    Use TUMS for gerd flares.    Follow up in 6 months., sooner if BP still elevated at home > 140/ 90.  Call the office: [phone] for questions or concerns.  Please allow 48-72 hours for medication refills.

## 2024-02-07 DIAGNOSIS — E78.2 MIXED HYPERLIPIDEMIA: Primary | ICD-10-CM

## 2024-02-07 RX ORDER — PRAVASTATIN SODIUM 40 MG/1
40 TABLET ORAL DAILY
Qty: 90 TABLET | Refills: 3 | Status: SHIPPED | OUTPATIENT
Start: 2024-02-07 | End: 2025-02-06

## 2024-02-07 NOTE — RESULT ENCOUNTER NOTE
Had in depth conversation regarding labs and risk stratifying his risk of MI. ASCVD 10%, with normal Cholesterol to HDL ratio and improved cholesterol from year prior. Recommend CT calcium score for further risk stratification, however Mike would like to start statin therapy now. He would benefit from moderate intensity. He will call office on Monday if changes mind, would like additional testing for he has cardiac stress test on Friday.

## 2024-02-09 ENCOUNTER — APPOINTMENT (OUTPATIENT)
Dept: CARDIOLOGY | Facility: CLINIC | Age: 64
End: 2024-02-09
Payer: COMMERCIAL

## 2024-02-09 ENCOUNTER — HOSPITAL ENCOUNTER (OUTPATIENT)
Dept: CARDIOLOGY | Facility: CLINIC | Age: 64
Discharge: HOME | End: 2024-02-09
Payer: COMMERCIAL

## 2024-02-09 DIAGNOSIS — I20.89 ATYPICAL ANGINA (CMS-HCC): ICD-10-CM

## 2024-02-09 DIAGNOSIS — I20.9 ANGINA PECTORIS, UNSPECIFIED (CMS-HCC): ICD-10-CM

## 2024-02-09 PROCEDURE — 93016 CV STRESS TEST SUPVJ ONLY: CPT | Performed by: INTERNAL MEDICINE

## 2024-02-09 PROCEDURE — 93018 CV STRESS TEST I&R ONLY: CPT | Performed by: INTERNAL MEDICINE

## 2024-02-09 PROCEDURE — 93017 CV STRESS TEST TRACING ONLY: CPT

## 2024-02-27 ENCOUNTER — TELEPHONE (OUTPATIENT)
Dept: PRIMARY CARE | Facility: CLINIC | Age: 64
End: 2024-02-27
Payer: COMMERCIAL

## 2024-02-27 DIAGNOSIS — I10 BENIGN HYPERTENSION: ICD-10-CM

## 2024-02-27 RX ORDER — AMLODIPINE AND BENAZEPRIL HYDROCHLORIDE 5; 20 MG/1; MG/1
1 CAPSULE ORAL DAILY
Qty: 90 CAPSULE | Refills: 3 | Status: SHIPPED | OUTPATIENT
Start: 2024-02-27 | End: 2025-02-26

## 2024-06-02 ENCOUNTER — HOSPITAL ENCOUNTER (OUTPATIENT)
Dept: RADIOLOGY | Facility: EXTERNAL LOCATION | Age: 64
Discharge: HOME | End: 2024-06-02
Payer: COMMERCIAL

## 2024-06-02 DIAGNOSIS — M25.562 ACUTE PAIN OF LEFT KNEE: ICD-10-CM

## 2024-06-22 DIAGNOSIS — K21.9 GASTROESOPHAGEAL REFLUX DISEASE WITHOUT ESOPHAGITIS: ICD-10-CM

## 2024-06-24 RX ORDER — PANTOPRAZOLE SODIUM 20 MG/1
20 TABLET, DELAYED RELEASE ORAL DAILY
Qty: 90 TABLET | Refills: 1 | Status: SHIPPED | OUTPATIENT
Start: 2024-06-24

## 2024-08-29 ENCOUNTER — OFFICE VISIT (OUTPATIENT)
Dept: PRIMARY CARE | Facility: CLINIC | Age: 64
End: 2024-08-29
Payer: COMMERCIAL

## 2024-08-29 VITALS
SYSTOLIC BLOOD PRESSURE: 132 MMHG | HEART RATE: 92 BPM | BODY MASS INDEX: 25.31 KG/M2 | RESPIRATION RATE: 16 BRPM | WEIGHT: 167 LBS | DIASTOLIC BLOOD PRESSURE: 82 MMHG | TEMPERATURE: 98.2 F | OXYGEN SATURATION: 98 % | HEIGHT: 68 IN

## 2024-08-29 DIAGNOSIS — F41.9 ANXIETY: Primary | ICD-10-CM

## 2024-08-29 DIAGNOSIS — I10 BENIGN HYPERTENSION: ICD-10-CM

## 2024-08-29 PROCEDURE — 1036F TOBACCO NON-USER: CPT

## 2024-08-29 PROCEDURE — 3008F BODY MASS INDEX DOCD: CPT

## 2024-08-29 PROCEDURE — 3075F SYST BP GE 130 - 139MM HG: CPT

## 2024-08-29 PROCEDURE — 3079F DIAST BP 80-89 MM HG: CPT

## 2024-08-29 PROCEDURE — 99214 OFFICE O/P EST MOD 30 MIN: CPT

## 2024-08-29 RX ORDER — ESCITALOPRAM OXALATE 10 MG/1
10 TABLET ORAL DAILY
Qty: 30 TABLET | Refills: 2 | Status: SHIPPED | OUTPATIENT
Start: 2024-08-29 | End: 2024-11-27

## 2024-08-29 RX ORDER — HYDROXYZINE HYDROCHLORIDE 25 MG/1
25 TABLET, FILM COATED ORAL 2 TIMES DAILY PRN
Qty: 60 TABLET | Refills: 0 | Status: SHIPPED | OUTPATIENT
Start: 2024-08-29 | End: 2024-09-28

## 2024-08-29 ASSESSMENT — ENCOUNTER SYMPTOMS
LIGHT-HEADEDNESS: 0
NERVOUS/ANXIOUS: 1
SHORTNESS OF BREATH: 0
DIZZINESS: 0
FEVER: 0
AGITATION: 1
SLEEP DISTURBANCE: 1
VOMITING: 0
HYPERTENSION: 1
NAUSEA: 0

## 2024-08-29 NOTE — ASSESSMENT & PLAN NOTE
Pt does have HTN which is controlled pretty well. He does check it at home and it does run lower 130s/80s lately but does get much higher when he is stressed out. Likely with controlling pts baseline anxiety will see improvements in BP. Pt will follow up in two weeks and see PCP. Can go over BP and anxiety again at that point.   Orders:    Follow Up In [facility] - PCP - Established; Future

## 2024-08-29 NOTE — ASSESSMENT & PLAN NOTE
Pt presenting with worsening anxiety and stress and feels like he needs help managing it. He is at the point where he is having difficulty coping on his own. He is open to trying medication and speaking to a therapist. Pt to begin atarax BID prn for when he has the borderline panic attacks. Pt to begin lexapro daily. Therapy referral given.  Orders:    hydrOXYzine HCL (Atarax) 25 mg tablet; Take 1 tablet (25 mg) by mouth 2 times a day as needed for anxiety.    escitalopram (Lexapro) 10 mg tablet; Take 1 tablet (10 mg) by mouth once daily.    Referral to Psychology; Future    Follow Up In [facility] - PCP - Established; Future

## 2024-08-29 NOTE — PROGRESS NOTES
Subjective   Mike Parkinson is a 63 y.o. male who presents for Hypertension.  Pt presenting today for BP follow up. He has had increased stress at work which he feels like is worsening his anxiety. He is being pushed out of work which is causing him a great deal of stress. There is a coworker bullying him and it has been going for a year but much worse the last month. He is at the point where he is not sleeping well and is losing appetite. He endorses having a couple border line panic attacks per week. He has brought the issue to HR and they are in the middle of trying to handle it. He is open to medication and counseling at this time.     In terms of his BP, he does check his numbers at home. He runs around low 130s/80s. He has checked it a couple times when he is dealing with anxiety and his numbers are much higher.     Hypertension  Pertinent negatives include no chest pain or shortness of breath.       Review of Systems   Constitutional:  Negative for fever.   Respiratory:  Negative for shortness of breath.    Cardiovascular:  Negative for chest pain.   Gastrointestinal:  Negative for nausea and vomiting.   Neurological:  Negative for dizziness and light-headedness.   Psychiatric/Behavioral:  Positive for agitation and sleep disturbance. The patient is nervous/anxious.    All other systems reviewed and are negative.      Objective   Physical Exam  Vitals reviewed.   Constitutional:       General: He is not in acute distress.     Appearance: Normal appearance. He is not toxic-appearing.   HENT:      Head: Normocephalic and atraumatic.      Nose: Nose normal.   Eyes:      Extraocular Movements: Extraocular movements intact.   Cardiovascular:      Rate and Rhythm: Normal rate and regular rhythm.      Heart sounds: No murmur heard.     No friction rub. No gallop.   Pulmonary:      Effort: Pulmonary effort is normal. No respiratory distress.      Breath sounds: Normal breath sounds. No wheezing, rhonchi or rales.    Skin:     General: Skin is warm and dry.   Neurological:      General: No focal deficit present.      Mental Status: He is alert.   Psychiatric:         Mood and Affect: Mood normal.         Behavior: Behavior normal.         Assessment & Plan  Anxiety  Pt presenting with worsening anxiety and stress and feels like he needs help managing it. He is at the point where he is having difficulty coping on his own. He is open to trying medication and speaking to a therapist. Pt to begin atarax BID prn for when he has the borderline panic attacks. Pt to begin lexapro daily. Therapy referral given.  Orders:    hydrOXYzine HCL (Atarax) 25 mg tablet; Take 1 tablet (25 mg) by mouth 2 times a day as needed for anxiety.    escitalopram (Lexapro) 10 mg tablet; Take 1 tablet (10 mg) by mouth once daily.    Referral to Psychology; Future    Follow Up In [facility] - PCP - Established; Future    Benign hypertension  Pt does have HTN which is controlled pretty well. He does check it at home and it does run lower 130s/80s lately but does get much higher when he is stressed out. Likely with controlling pts baseline anxiety will see improvements in BP. Pt will follow up in two weeks and see PCP. Can go over BP and anxiety again at that point.   Orders:    Follow Up [facility] - PCP - Established; Future              Quentin Beckford,

## 2024-08-30 NOTE — PROGRESS NOTES
I reviewed the resident/fellow's documentation and discussed the patient with the resident/fellow. I agree with the resident/fellow's medical decision making as documented in the note.     Lyn Muhammad MD

## 2024-09-25 DIAGNOSIS — F41.9 ANXIETY: ICD-10-CM

## 2024-09-25 RX ORDER — HYDROXYZINE HYDROCHLORIDE 25 MG/1
25 TABLET, FILM COATED ORAL 2 TIMES DAILY PRN
Qty: 60 TABLET | Refills: 0 | Status: SHIPPED | OUTPATIENT
Start: 2024-09-25 | End: 2024-10-25

## 2024-09-26 ENCOUNTER — APPOINTMENT (OUTPATIENT)
Dept: PRIMARY CARE | Facility: CLINIC | Age: 64
End: 2024-09-26
Payer: COMMERCIAL

## 2024-09-26 VITALS
TEMPERATURE: 98 F | DIASTOLIC BLOOD PRESSURE: 74 MMHG | SYSTOLIC BLOOD PRESSURE: 118 MMHG | HEIGHT: 68 IN | HEART RATE: 66 BPM | RESPIRATION RATE: 16 BRPM | BODY MASS INDEX: 25.61 KG/M2 | OXYGEN SATURATION: 98 % | WEIGHT: 169 LBS

## 2024-09-26 DIAGNOSIS — I10 BENIGN HYPERTENSION: ICD-10-CM

## 2024-09-26 DIAGNOSIS — F41.9 ANXIETY: Primary | ICD-10-CM

## 2024-09-26 PROCEDURE — 3078F DIAST BP <80 MM HG: CPT

## 2024-09-26 PROCEDURE — 3008F BODY MASS INDEX DOCD: CPT

## 2024-09-26 PROCEDURE — 1036F TOBACCO NON-USER: CPT

## 2024-09-26 PROCEDURE — 99213 OFFICE O/P EST LOW 20 MIN: CPT

## 2024-09-26 PROCEDURE — 3074F SYST BP LT 130 MM HG: CPT

## 2024-09-26 ASSESSMENT — ENCOUNTER SYMPTOMS
FEVER: 0
SHORTNESS OF BREATH: 0
DIZZINESS: 0
VOMITING: 0
NAUSEA: 0
HYPERTENSION: 1
LIGHT-HEADEDNESS: 0

## 2024-09-26 NOTE — ASSESSMENT & PLAN NOTE
BP today at 118/74. As expected by adequately treating his anxiety we have seen a dec in BP readings. No changes needed to medication at this time.

## 2024-09-26 NOTE — PROGRESS NOTES
Subjective   Mike Parkinson is a 63 y.o. male who presents for Hypertension.  Pt presenting for follow up for HTN and anxiety. He has been taking his medication as prescribed.     In terms of his anxiety. He has noticed that he is sleeping better up to 8 hours a day times. He has been feeling a little foggy since starting it but is open to continuing it. He also is not waking up with scattered thoughts and feelings of anxiety anymore. He feels like he has a better grasp on his anxiety and agitation. He unfortunately did not get in with counseling yet due to communication issues. He feels like his boss is handling the situation well at work. He feels like the issues are being addressed at work. He has not needed his atarax much and still has many pills left. He feels like when he does take the atarax it does help with his brain fog.    In terms of his BP his readings are in an acceptable range today. As expected his readings did improve as we got a better grasp on his anxiety.     Hypertension  Pertinent negatives include no chest pain or shortness of breath.       Current Outpatient Medications on File Prior to Visit   Medication Sig Dispense Refill    amLODIPine-benazepriL (LotreL) 5-20 mg capsule Take 1 capsule by mouth once daily. 90 capsule 3    escitalopram (Lexapro) 10 mg tablet Take 1 tablet (10 mg) by mouth once daily. 30 tablet 2    fluticasone (Flonase) 50 mcg/actuation nasal spray Administer 1 spray into each nostril once daily. Shake gently. Before first use, prime pump. After use, clean tip and replace cap. 16 g 11    hydrOXYzine HCL (Atarax) 25 mg tablet TAKE 1 TABLET (25 MG) BY MOUTH 2 TIMES A DAY AS NEEDED FOR ANXIETY 60 tablet 0    pantoprazole (ProtoNix) 20 mg EC tablet TAKE 1 TABLET BY MOUTH EVERY DAY 90 tablet 1    pravastatin (PravachoL) 40 mg tablet Take 1 tablet (40 mg) by mouth once daily. 90 tablet 3    [DISCONTINUED] hydrOXYzine HCL (Atarax) 25 mg tablet Take 1 tablet (25 mg) by mouth 2  times a day as needed for anxiety. 60 tablet 0     No current facility-administered medications on file prior to visit.       Review of Systems   Constitutional:  Negative for fever.   Respiratory:  Negative for shortness of breath.    Cardiovascular:  Negative for chest pain.   Gastrointestinal:  Negative for nausea and vomiting.   Neurological:  Negative for dizziness and light-headedness.   All other systems reviewed and are negative.      Vitals:    09/26/24 1254   BP: 118/74   Pulse: 66   Resp: 16   Temp: 36.7 °C (98 °F)   SpO2: 98%     Objective   Physical Exam  Vitals reviewed.   Constitutional:       General: He is not in acute distress.     Appearance: Normal appearance. He is not toxic-appearing.   HENT:      Head: Normocephalic and atraumatic.      Nose: Nose normal.   Eyes:      Extraocular Movements: Extraocular movements intact.   Cardiovascular:      Rate and Rhythm: Normal rate and regular rhythm.      Heart sounds: No murmur heard.     No friction rub. No gallop.   Pulmonary:      Effort: Pulmonary effort is normal. No respiratory distress.      Breath sounds: Normal breath sounds. No wheezing, rhonchi or rales.   Skin:     General: Skin is warm and dry.   Neurological:      General: No focal deficit present.      Mental Status: He is alert.   Psychiatric:         Mood and Affect: Mood normal.         Behavior: Behavior normal.         Assessment & Plan  Anxiety  Pt presenting for anxiety follow up. Since last visit he has been taking lexapro daily and atarax prn. He has noticed a kaba difference in his anxiety and is happy with how he is doing. He has noticed that he is getting some brain fog but symptoms should normalize once he is on it for a couple more weeks. If needed can revisit medication at that time. Do still recommend pt establish with counseling.        Benign hypertension  BP today at 118/74. As expected by adequately treating his anxiety we have seen a dec in BP readings. No changes  needed to medication at this time.                  Quentin Beckford, DO

## 2024-09-26 NOTE — ASSESSMENT & PLAN NOTE
Pt presenting for anxiety follow up. Since last visit he has been taking lexapro daily and atarax prn. He has noticed a kaba difference in his anxiety and is happy with how he is doing. He has noticed that he is getting some brain fog but symptoms should normalize once he is on it for a couple more weeks. If needed can revisit medication at that time. Do still recommend pt establish with counseling.

## 2024-10-24 ENCOUNTER — TELEPHONE (OUTPATIENT)
Dept: PRIMARY CARE | Facility: CLINIC | Age: 64
End: 2024-10-24
Payer: COMMERCIAL

## 2024-10-24 DIAGNOSIS — F41.9 ANXIETY: ICD-10-CM

## 2024-10-24 NOTE — TELEPHONE ENCOUNTER
Rx Refill Request Telephone Encounter    Name:  Mike Parkinson  :  [id number]  Medication Name:            escitalopram (Lexapro) 10 mg tablet        Sig: Take 1 tablet (10 mg) by mouth once daily.          Specific Pharmacy location:  [facility]/pharmacy #[id number] - [facility] [address] [place] [address] [address] [facility] [address]  Phone: [phone]  Fax: [phone]

## 2024-10-25 RX ORDER — ESCITALOPRAM OXALATE 10 MG/1
10 TABLET ORAL DAILY
Qty: 90 TABLET | Refills: 1 | Status: SHIPPED | OUTPATIENT
Start: 2024-10-25 | End: 2025-04-23

## 2024-11-03 DIAGNOSIS — F41.9 ANXIETY: ICD-10-CM

## 2024-11-04 RX ORDER — HYDROXYZINE HYDROCHLORIDE 25 MG/1
25 TABLET, FILM COATED ORAL 2 TIMES DAILY PRN
Qty: 60 TABLET | Refills: 0 | Status: SHIPPED | OUTPATIENT
Start: 2024-11-04 | End: 2024-12-04

## 2024-12-04 DIAGNOSIS — F41.9 ANXIETY: ICD-10-CM

## 2024-12-04 RX ORDER — HYDROXYZINE HYDROCHLORIDE 25 MG/1
25 TABLET, FILM COATED ORAL 2 TIMES DAILY PRN
Qty: 60 TABLET | Refills: 0 | Status: SHIPPED | OUTPATIENT
Start: 2024-12-04 | End: 2025-01-03

## 2024-12-30 ENCOUNTER — TELEPHONE (OUTPATIENT)
Dept: PRIMARY CARE | Facility: CLINIC | Age: 64
End: 2024-12-30
Payer: COMMERCIAL

## 2024-12-30 DIAGNOSIS — K21.9 GASTROESOPHAGEAL REFLUX DISEASE WITHOUT ESOPHAGITIS: ICD-10-CM

## 2024-12-30 RX ORDER — PANTOPRAZOLE SODIUM 20 MG/1
20 TABLET, DELAYED RELEASE ORAL DAILY
Qty: 90 TABLET | Refills: 1 | Status: SHIPPED | OUTPATIENT
Start: 2024-12-30

## 2025-01-04 DIAGNOSIS — F41.9 ANXIETY: ICD-10-CM

## 2025-01-06 RX ORDER — HYDROXYZINE HYDROCHLORIDE 25 MG/1
25 TABLET, FILM COATED ORAL 2 TIMES DAILY PRN
Qty: 90 TABLET | Refills: 0 | Status: SHIPPED | OUTPATIENT
Start: 2025-01-06 | End: 2025-02-20

## 2025-02-08 DIAGNOSIS — F41.9 ANXIETY: ICD-10-CM

## 2025-02-10 RX ORDER — HYDROXYZINE HYDROCHLORIDE 25 MG/1
25 TABLET, FILM COATED ORAL 2 TIMES DAILY PRN
Qty: 60 TABLET | Refills: 1 | Status: SHIPPED | OUTPATIENT
Start: 2025-02-10 | End: 2025-03-27

## 2025-02-18 DIAGNOSIS — F41.9 ANXIETY: ICD-10-CM

## 2025-02-18 RX ORDER — SERTRALINE HYDROCHLORIDE 50 MG/1
50 TABLET, FILM COATED ORAL DAILY
Qty: 90 TABLET | Refills: 3 | Status: SHIPPED | OUTPATIENT
Start: 2025-02-18 | End: 2026-02-18

## 2025-02-25 ENCOUNTER — TELEPHONE (OUTPATIENT)
Dept: PRIMARY CARE | Facility: CLINIC | Age: 65
End: 2025-02-25
Payer: COMMERCIAL

## 2025-02-25 DIAGNOSIS — F41.9 ANXIETY: Primary | ICD-10-CM

## 2025-02-25 RX ORDER — ESCITALOPRAM OXALATE 5 MG/1
5 TABLET ORAL DAILY
Qty: 30 TABLET | Refills: 1 | Status: SHIPPED | OUTPATIENT
Start: 2025-02-25

## 2025-02-25 NOTE — TELEPHONE ENCOUNTER
Pt called stated that you changed his medications from escitalopram  to zolft. Pt asking if he should be both medications or should he finish with the previous medication before starting new medication?

## 2025-04-11 DIAGNOSIS — F41.9 ANXIETY: ICD-10-CM

## 2025-04-11 RX ORDER — HYDROXYZINE HYDROCHLORIDE 25 MG/1
25 TABLET, FILM COATED ORAL 2 TIMES DAILY PRN
Qty: 60 TABLET | Refills: 1 | Status: SHIPPED | OUTPATIENT
Start: 2025-04-11 | End: 2025-05-26

## 2025-04-18 DIAGNOSIS — F41.9 ANXIETY: ICD-10-CM

## 2025-04-18 RX ORDER — ESCITALOPRAM OXALATE 5 MG/1
5 TABLET ORAL DAILY
Qty: 90 TABLET | Refills: 1 | Status: SHIPPED | OUTPATIENT
Start: 2025-04-18

## 2025-05-03 DIAGNOSIS — I10 BENIGN HYPERTENSION: ICD-10-CM

## 2025-05-05 RX ORDER — AMLODIPINE AND BENAZEPRIL HYDROCHLORIDE 5; 20 MG/1; MG/1
1 CAPSULE ORAL DAILY
Qty: 90 CAPSULE | Refills: 1 | Status: SHIPPED | OUTPATIENT
Start: 2025-05-05

## 2025-06-14 DIAGNOSIS — K21.9 GASTROESOPHAGEAL REFLUX DISEASE WITHOUT ESOPHAGITIS: ICD-10-CM

## 2025-06-17 RX ORDER — PANTOPRAZOLE SODIUM 20 MG/1
20 TABLET, DELAYED RELEASE ORAL DAILY
Qty: 90 TABLET | Refills: 1 | Status: SHIPPED | OUTPATIENT
Start: 2025-06-17

## 2025-06-19 DIAGNOSIS — F41.9 ANXIETY: ICD-10-CM

## 2025-06-19 RX ORDER — HYDROXYZINE HYDROCHLORIDE 25 MG/1
25 TABLET, FILM COATED ORAL 2 TIMES DAILY PRN
Qty: 60 TABLET | Refills: 1 | Status: SHIPPED | OUTPATIENT
Start: 2025-06-19 | End: 2025-08-03

## 2025-08-15 DIAGNOSIS — F41.9 ANXIETY: ICD-10-CM

## 2025-08-15 RX ORDER — HYDROXYZINE HYDROCHLORIDE 25 MG/1
25 TABLET, FILM COATED ORAL 2 TIMES DAILY PRN
Qty: 60 TABLET | Refills: 1 | Status: SHIPPED | OUTPATIENT
Start: 2025-08-15